# Patient Record
Sex: FEMALE | Race: BLACK OR AFRICAN AMERICAN | Employment: FULL TIME | URBAN - METROPOLITAN AREA
[De-identification: names, ages, dates, MRNs, and addresses within clinical notes are randomized per-mention and may not be internally consistent; named-entity substitution may affect disease eponyms.]

---

## 2017-08-24 ENCOUNTER — APPOINTMENT (OUTPATIENT)
Dept: CT IMAGING | Age: 42
End: 2017-08-24
Attending: EMERGENCY MEDICINE
Payer: COMMERCIAL

## 2017-08-24 ENCOUNTER — HOSPITAL ENCOUNTER (EMERGENCY)
Age: 42
Discharge: HOME | End: 2017-08-24
Attending: EMERGENCY MEDICINE
Payer: COMMERCIAL

## 2017-08-24 VITALS
DIASTOLIC BLOOD PRESSURE: 67 MMHG | SYSTOLIC BLOOD PRESSURE: 109 MMHG | HEIGHT: 61 IN | TEMPERATURE: 98.6 F | WEIGHT: 170.2 LBS | HEART RATE: 94 BPM | RESPIRATION RATE: 20 BRPM | BODY MASS INDEX: 32.13 KG/M2 | OXYGEN SATURATION: 97 %

## 2017-08-24 DIAGNOSIS — R42 DIZZINESS: Primary | ICD-10-CM

## 2017-08-24 DIAGNOSIS — E04.1 THYROID NODULE: ICD-10-CM

## 2017-08-24 DIAGNOSIS — R00.2 PALPITATIONS: ICD-10-CM

## 2017-08-24 LAB
ALBUMIN SERPL-MCNC: 4 G/DL (ref 3.5–5)
ALBUMIN/GLOB SERPL: 0.9 {RATIO} (ref 1.1–2.2)
ALP SERPL-CCNC: 135 U/L (ref 45–117)
ALT SERPL-CCNC: 28 U/L (ref 12–78)
ANION GAP SERPL CALC-SCNC: 7 MMOL/L (ref 5–15)
AST SERPL-CCNC: 23 U/L (ref 15–37)
ATRIAL RATE: 116 BPM
BASOPHILS # BLD: 0 K/UL (ref 0–0.1)
BASOPHILS NFR BLD: 0 % (ref 0–1)
BILIRUB SERPL-MCNC: 0.2 MG/DL (ref 0.2–1)
BUN SERPL-MCNC: 9 MG/DL (ref 6–20)
BUN/CREAT SERPL: 11 (ref 12–20)
CALCIUM SERPL-MCNC: 8.7 MG/DL (ref 8.5–10.1)
CALCULATED P AXIS, ECG09: 42 DEGREES
CALCULATED R AXIS, ECG10: 29 DEGREES
CALCULATED T AXIS, ECG11: 15 DEGREES
CHLORIDE SERPL-SCNC: 105 MMOL/L (ref 97–108)
CO2 SERPL-SCNC: 28 MMOL/L (ref 21–32)
CREAT SERPL-MCNC: 0.81 MG/DL (ref 0.55–1.02)
DIAGNOSIS, 93000: NORMAL
EOSINOPHIL # BLD: 0.1 K/UL (ref 0–0.4)
EOSINOPHIL NFR BLD: 1 % (ref 0–7)
ERYTHROCYTE [DISTWIDTH] IN BLOOD BY AUTOMATED COUNT: 14.1 % (ref 11.5–14.5)
GLOBULIN SER CALC-MCNC: 4.5 G/DL (ref 2–4)
GLUCOSE SERPL-MCNC: 129 MG/DL (ref 65–100)
HCT VFR BLD AUTO: 45.1 % (ref 35–47)
HGB BLD-MCNC: 14.9 G/DL (ref 11.5–16)
LYMPHOCYTES # BLD: 3.2 K/UL (ref 0.8–3.5)
LYMPHOCYTES NFR BLD: 46 % (ref 12–49)
MCH RBC QN AUTO: 27.8 PG (ref 26–34)
MCHC RBC AUTO-ENTMCNC: 33 G/DL (ref 30–36.5)
MCV RBC AUTO: 84.1 FL (ref 80–99)
MONOCYTES # BLD: 0.4 K/UL (ref 0–1)
MONOCYTES NFR BLD: 5 % (ref 5–13)
NEUTS SEG # BLD: 3.3 K/UL (ref 1.8–8)
NEUTS SEG NFR BLD: 48 % (ref 32–75)
P-R INTERVAL, ECG05: 174 MS
PLATELET # BLD AUTO: 242 K/UL (ref 150–400)
POTASSIUM SERPL-SCNC: 3.5 MMOL/L (ref 3.5–5.1)
PROT SERPL-MCNC: 8.5 G/DL (ref 6.4–8.2)
Q-T INTERVAL, ECG07: 290 MS
QRS DURATION, ECG06: 66 MS
QTC CALCULATION (BEZET), ECG08: 403 MS
RBC # BLD AUTO: 5.36 M/UL (ref 3.8–5.2)
SODIUM SERPL-SCNC: 140 MMOL/L (ref 136–145)
TROPONIN I SERPL-MCNC: <0.04 NG/ML
TSH SERPL DL<=0.05 MIU/L-ACNC: 2.21 UIU/ML (ref 0.36–3.74)
VENTRICULAR RATE, ECG03: 116 BPM
WBC # BLD AUTO: 7 K/UL (ref 3.6–11)

## 2017-08-24 PROCEDURE — 74011250636 HC RX REV CODE- 250/636: Performed by: EMERGENCY MEDICINE

## 2017-08-24 PROCEDURE — 99284 EMERGENCY DEPT VISIT MOD MDM: CPT

## 2017-08-24 PROCEDURE — 71275 CT ANGIOGRAPHY CHEST: CPT

## 2017-08-24 PROCEDURE — 74011000258 HC RX REV CODE- 258: Performed by: EMERGENCY MEDICINE

## 2017-08-24 PROCEDURE — 96374 THER/PROPH/DIAG INJ IV PUSH: CPT

## 2017-08-24 PROCEDURE — 96361 HYDRATE IV INFUSION ADD-ON: CPT

## 2017-08-24 PROCEDURE — 93005 ELECTROCARDIOGRAM TRACING: CPT

## 2017-08-24 PROCEDURE — 85025 COMPLETE CBC W/AUTO DIFF WBC: CPT | Performed by: EMERGENCY MEDICINE

## 2017-08-24 PROCEDURE — 80053 COMPREHEN METABOLIC PANEL: CPT | Performed by: EMERGENCY MEDICINE

## 2017-08-24 PROCEDURE — 84484 ASSAY OF TROPONIN QUANT: CPT | Performed by: EMERGENCY MEDICINE

## 2017-08-24 PROCEDURE — 74011636320 HC RX REV CODE- 636/320: Performed by: EMERGENCY MEDICINE

## 2017-08-24 PROCEDURE — 36415 COLL VENOUS BLD VENIPUNCTURE: CPT | Performed by: EMERGENCY MEDICINE

## 2017-08-24 PROCEDURE — 84443 ASSAY THYROID STIM HORMONE: CPT | Performed by: EMERGENCY MEDICINE

## 2017-08-24 RX ORDER — SODIUM CHLORIDE 9 MG/ML
1000 INJECTION, SOLUTION INTRAVENOUS ONCE
Status: COMPLETED | OUTPATIENT
Start: 2017-08-24 | End: 2017-08-24

## 2017-08-24 RX ORDER — SODIUM CHLORIDE 0.9 % (FLUSH) 0.9 %
10 SYRINGE (ML) INJECTION
Status: COMPLETED | OUTPATIENT
Start: 2017-08-24 | End: 2017-08-24

## 2017-08-24 RX ORDER — LORAZEPAM 2 MG/ML
1 INJECTION INTRAMUSCULAR
Status: COMPLETED | OUTPATIENT
Start: 2017-08-24 | End: 2017-08-24

## 2017-08-24 RX ADMIN — IOPAMIDOL 70 ML: 755 INJECTION, SOLUTION INTRAVENOUS at 02:54

## 2017-08-24 RX ADMIN — LORAZEPAM 1 MG: 2 INJECTION INTRAMUSCULAR; INTRAVENOUS at 02:29

## 2017-08-24 RX ADMIN — SODIUM CHLORIDE 1000 ML: 900 INJECTION, SOLUTION INTRAVENOUS at 02:31

## 2017-08-24 RX ADMIN — SODIUM CHLORIDE 100 ML: 900 INJECTION, SOLUTION INTRAVENOUS at 02:54

## 2017-08-24 RX ADMIN — Medication 10 ML: at 02:54

## 2017-08-24 NOTE — ED PROVIDER NOTES
HPI Comments: 36 yo female presents to ER for evaluation of dizziness, racing heart and shortness of breath. Onset was 1 hour prior to arrival. Pt reports that she has sensation of heart racing. No skipping of beats. No chest pain. Pt reports she then began to feel short of breath. She states she feels like she cant catch her breath. Pt has associated nausea after drinking some water but no vomiting. Pt reports an associated dizziness described as lightheadness. No fever, chills, cough, congestion, runny nose or sore throat. No headache, muscle aches or body aches. No dysuria, frequency or urgency. Pt had similar symptoms 1 week ago. Pt seen at outside ER and was given fluid and told to followup with her pcp. No known precipitating events. No alleviating factors at this time. Pt with hx of PE. Nonsmoker, denies alcohol, recreational drug use, recent travel, trauma, immobilization. Social hx  Nonsmoker  No alcohol      Patient is a 37 y.o. female presenting with dizziness. The history is provided by the patient. Dizziness   Associated symptoms include shortness of breath and nausea. Pertinent negatives include no chest pain, no vomiting and no headaches. Past Medical History:   Diagnosis Date    ADHD (attention deficit hyperactivity disorder)     Anemia     Insomnia     Migraine     PE (pulmonary embolism)        Past Surgical History:   Procedure Laterality Date    HX CHOLECYSTECTOMY      HX GASTRIC BYPASS  2009    HX GYN      HX HERNIA REPAIR      HX HYSTERECTOMY           History reviewed. No pertinent family history. Social History     Social History    Marital status:      Spouse name: N/A    Number of children: N/A    Years of education: N/A     Occupational History    Not on file.      Social History Main Topics    Smoking status: Never Smoker    Smokeless tobacco: Not on file    Alcohol use No    Drug use: No    Sexual activity: Not on file     Other Topics Concern    Not on file     Social History Narrative         ALLERGIES: Aspirin; Darvocet a500 [propoxyphene n-acetaminophen]; Percocet [oxycodone-acetaminophen]; and Percodan [oxycodone hcl-oxycodone-asa]    Review of Systems   Constitutional: Negative for chills and fever. Respiratory: Positive for shortness of breath. Negative for cough. Cardiovascular: Positive for palpitations. Negative for chest pain. Gastrointestinal: Positive for nausea. Negative for abdominal pain, blood in stool, diarrhea and vomiting. Genitourinary: Negative for dysuria, flank pain, frequency and urgency. Musculoskeletal: Negative for arthralgias, myalgias, neck pain and neck stiffness. Skin: Negative for rash and wound. Neurological: Positive for dizziness. Negative for weakness, numbness and headaches. All other systems reviewed and are negative. Vitals:    08/24/17 0114   BP: 141/89   Pulse: (!) 113   Resp: 16   Temp: 97.7 °F (36.5 °C)   SpO2: 100%   Weight: 77.2 kg (170 lb 3.2 oz)   Height: 5' 1\" (1.549 m)            Physical Exam   Constitutional: She is oriented to person, place, and time. She appears well-developed and well-nourished. No distress. HENT:   Head: Normocephalic and atraumatic. Right Ear: External ear normal.   Left Ear: External ear normal.   Eyes: Conjunctivae and EOM are normal. Pupils are equal, round, and reactive to light. Neck: Normal range of motion. Neck supple. Cardiovascular: Regular rhythm and normal heart sounds. Tachycardia present. Pulmonary/Chest: Effort normal and breath sounds normal. No respiratory distress. She has no wheezes. Abdominal: Soft. Normal appearance and bowel sounds are normal. She exhibits no shifting dullness, no distension, no pulsatile liver, no abdominal bruit, no pulsatile midline mass and no mass. There is no hepatosplenomegaly, splenomegaly or hepatomegaly. There is no tenderness.  There is no rigidity, no rebound, no guarding, no CVA tenderness, no tenderness at McBurney's point and negative Whelan's sign. Musculoskeletal: Normal range of motion. She exhibits no edema or tenderness. Neurological: She is alert and oriented to person, place, and time. She has normal reflexes. No cranial nerve deficit. Coordination normal.   Skin: Skin is warm and dry. No rash noted. No erythema. Psychiatric: She has a normal mood and affect. Her behavior is normal. Judgment and thought content normal.   Nursing note and vitals reviewed. MDM  Number of Diagnoses or Management Options  Dizziness:   Palpitations:   Thyroid nodule:   Diagnosis management comments: 38 yo female presenting to ER for dizziness, heart racing and shortness of breath. Lungs clear bilaterally. No respiratory distress. Pt slightly tachycardic. P: CT, lab, iv fluid    4:37 AM  Pt feeling better after iv fluid and ativan. No palpitations, no chest pain or shortness of breath. Heart rated now down to 90. CT negative for PE. Troponin negative. TSH in normal range. Discussed thyroid nodule with patient. She is already aware of it. Patient's results have been reviewed with them. Patient and/or family have verbally conveyed their understanding and agreement of the patient's signs, symptoms, diagnosis, treatment and prognosis and additionally agree to follow up as recommended or return to the Emergency Room should their condition change prior to follow-up. Discharge instructions have also been provided to the patient with some educational information regarding their diagnosis as well a list of reasons why they would want to return to the ER prior to their follow-up appointment should their condition change. Amount and/or Complexity of Data Reviewed  Discuss the patient with other providers: yes (ER attending-[facility])    Patient Progress  Patient progress: stable    ED Course       Procedures      ED EKG interpretation:  Rhythm: sinus tachycardia; and regular .  Rate (approx.): 116; Axis: normal; P wave: normal; QRS interval: normal ; ST/T wave: non-specific changes; This EKG was interpreted by Petar Fernandez PA-C, Dr. Malu Kaplan MD ED Provider. Pt case including HPI, PE, and all available lab and radiology results has been discussed with attending physician. Opportunity to evaluate patient has been provided to ER attending. Discharge and prescription plan has been agreed upon.

## 2017-08-24 NOTE — DISCHARGE INSTRUCTIONS
Thyroid Nodules: Care Instructions  Your Care Instructions  Thyroid nodules are growths or lumps in the thyroid gland. Your thyroid is in the front of your neck. It controls how your body uses energy. You may have tests to see if the nodule is caused by cancer. Most nodules aren't cancer and don't cause problems. Many don't even need treatment. If you do have cancer, it can usually be cured. Treatment will probably include surgery. You may also get radioactive iodine treatment. If your thyroid can't make thyroid hormone after treatment, you can take a pill every day to replace the hormone. Follow-up care is a key part of your treatment and safety. Be sure to make and go to all appointments, and call your doctor if you are having problems. It's also a good idea to know your test results and keep a list of the medicines you take. How can you care for yourself at home? · Be safe with medicines. If you take thyroid hormone medicine:  ¨ Take it exactly as prescribed. Call your doctor if you think you are having a problem with your medicine. If you take the right amount and don't skip doses, you probably won't have side effects. ¨ Do not take it with calcium, vitamins, or iron. ¨ Try not to miss a dose. ¨ Do not take extra doses. This will not help you get better any faster. It may also cause side effects. ¨ Tell your doctor about any medicines you take. This includes over-the-counter medicines. ¨ Wear a medical alert bracelet or necklace that says you take thyroid hormones. You can buy these at most drugstores. When should you call for help? Call 911 anytime you think you may need emergency care. For example, call if:  · You lose consciousness. Call your doctor now or seek immediate medical care if:  · You have shortness of breath. Watch closely for changes in your health, and be sure to contact your doctor if:  · You have pain in your neck, jaw, or ear. · You have problems swallowing.   · You feel weak and tired. · You have nervousness, a fast heartbeat, hand tremors, problems sleeping, increased sweating, and weight loss. · You do not feel better even though you are taking your medicine. Where can you learn more? Go to http://oralia-kalyani.info/. Enter E788 in the search box to learn more about \"Thyroid Nodules: Care Instructions. \"  Current as of: January 31, 2017  Content Version: 11.3  © 0881-0984 [employer]. Care instructions adapted under license by [employer] (which disclaims liability or warranty for this information). If you have questions about a medical condition or this instruction, always ask your healthcare professional. [place] [address] any warranty or liability for your use of this information. We hope that we have addressed all of your medical concerns. The examination and treatment you received in the Emergency Department were for an emergent problem and were not intended as complete care. It is important that you follow up with your healthcare provider(s) for ongoing care. If your symptoms worsen or do not improve as expected, and you are unable to reach your usual health care provider(s), you should return to the Emergency Department. Today's healthcare is undergoing tremendous change, and patient satisfaction surveys are one of the many tools to assess the quality of medical care. You may receive a survey from the [employer] organization regarding your experience in the Emergency Department. I hope that your experience has been completely positive, particularly the medical care that I provided. As such, please participate in the survey; anything less than excellent does not meet my expectations or intentions. [facility] and [facility] participate in nationally recognized quality of care measures.   If your blood pressure is greater than 120/80, as reported below, we urge that you seek medical care to address the potential of high blood pressure, commonly known as hypertension. Hypertension can be hereditary or can be caused by certain medical conditions, pain, stress, or \"white coat syndrome. \"       Please make an appointment with your health care provider(s) for follow up of your Emergency Department visit. VITALS:   Patient Vitals for the past 8 hrs:   Temp Pulse Resp BP SpO2   08/24/17 0330 - 89 21 98/57 99 %   08/24/17 0114 97.7 °F (36.5 °C) (!) 113 16 141/89 100 %          Thank you for allowing us to provide you with medical care today. We realize that you have many choices for your emergency care needs. Please choose us in the future for any continued health care needs. Myrick Kawasaki [address].   Office: [phone]            Recent Results (from the past 24 hour(s))   EKG, 12 LEAD, INITIAL    Collection Time: 08/24/17  1:27 AM   Result Value Ref Range    Ventricular Rate 116 BPM    Atrial Rate 116 BPM    P-R Interval 174 ms    QRS Duration 66 ms    Q-T Interval 290 ms    QTC Calculation (Bezet) 403 ms    Calculated P Axis 42 degrees    Calculated R Axis 29 degrees    Calculated T Axis 15 degrees    Diagnosis       Sinus tachycardia  Nonspecific T wave abnormality  When compared with ECG of 01-MAY-2016 10:59,  No significant change was found     CBC WITH AUTOMATED DIFF    Collection Time: 08/24/17  1:39 AM   Result Value Ref Range    WBC 7.0 3.6 - 11.0 K/uL    RBC 5.36 (H) 3.80 - 5.20 M/uL    HGB 14.9 11.5 - 16.0 g/dL    HCT 45.1 35.0 - 47.0 %    MCV 84.1 80.0 - 99.0 FL    MCH 27.8 26.0 - 34.0 PG    MCHC 33.0 30.0 - 36.5 g/dL    RDW 14.1 11.5 - 14.5 %    PLATELET 816 574 - 894 K/uL    NEUTROPHILS 48 32 - 75 %    LYMPHOCYTES 46 12 - 49 %    MONOCYTES 5 5 - 13 %    EOSINOPHILS 1 0 - 7 %    BASOPHILS 0 0 - 1 %    ABS. NEUTROPHILS 3.3 1.8 - 8.0 K/UL    ABS.  LYMPHOCYTES 3.2 0.8 - 3.5 K/UL ABS. MONOCYTES 0.4 0.0 - 1.0 K/UL    ABS. EOSINOPHILS 0.1 0.0 - 0.4 K/UL    ABS. BASOPHILS 0.0 0.0 - 0.1 K/UL   METABOLIC PANEL, COMPREHENSIVE    Collection Time: 08/24/17  1:39 AM   Result Value Ref Range    Sodium 140 136 - 145 mmol/L    Potassium 3.5 3.5 - 5.1 mmol/L    Chloride 105 97 - 108 mmol/L    CO2 28 21 - 32 mmol/L    Anion gap 7 5 - 15 mmol/L    Glucose 129 (H) 65 - 100 mg/dL    BUN 9 6 - 20 MG/DL    Creatinine 0.81 0.55 - 1.02 MG/DL    BUN/Creatinine ratio 11 (L) 12 - 20      GFR est AA >60 >60 ml/min/1.73m2    GFR est non-AA >60 >60 ml/min/1.73m2    Calcium 8.7 8.5 - 10.1 MG/DL    Bilirubin, total 0.2 0.2 - 1.0 MG/DL    ALT (SGPT) 28 12 - 78 U/L    AST (SGOT) 23 15 - 37 U/L    Alk. phosphatase 135 (H) 45 - 117 U/L    Protein, total 8.5 (H) 6.4 - 8.2 g/dL    Albumin 4.0 3.5 - 5.0 g/dL    Globulin 4.5 (H) 2.0 - 4.0 g/dL    A-G Ratio 0.9 (L) 1.1 - 2.2     TROPONIN I    Collection Time: 08/24/17  1:39 AM   Result Value Ref Range    Troponin-I, Qt. <0.04 <0.05 ng/mL       Cta Chest W Or W Wo Cont    Result Date: 8/24/2017  EXAM:  CT angiography chest INDICATION:  Tachycardia and shortness of breath. COMPARISON: None. TECHNIQUE: Helical thin section chest CT following uneventful intravenous administration of nonionic contrast according to departmental PE protocol. Coronal and sagittal reformats were performed. 3D/MIP post processing was performed. CT dose reduction was achieved through use of a standardized protocol tailored for this examination and automatic exposure control for dose modulation. Adaptive statistical iterative reconstruction (ASIR) was utilized. FINDINGS: This is a good quality study for the evaluation of pulmonary embolism to the first subsegmental arterial level. There is no pulmonary embolism to this level. A left thyroid nodule measures 1.9 x 2.0 cm. The aorta and main pulmonary artery are normal in caliber. Cardiac size is within normal limits. No pericardial effusion.  No lymphadenopathy by imaging size criteria. There is mild bilateral dependent atelectasis. There is no lung nodule, mass, or consolidation. No pleural effusion or pneumothorax. Central airways are unremarkable. There is a small hiatal hernia. Surgical changes are seen following gastric bypass. There is no acute abnormality in the visualized upper abdomen. The bony structures are age-appropriate     IMPRESSION: 1. There is no acute pulmonary embolism or other acute abnormality in the chest. 2. Left thyroid nodule measures 2.0 cm. Further evaluation with thyroid ultrasound is suggested if not recently performed. Dizziness: Care Instructions  Your Care Instructions  Dizziness is the feeling of unsteadiness or fuzziness in your head. It is different than having vertigo, which is a feeling that the room is spinning or that you are moving or falling. It is also different from lightheadedness, which is the feeling that you are about to faint. It can be hard to know what causes dizziness. Some people feel dizzy when they have migraine headaches. Sometimes bouts of flu can make you feel dizzy. Some medical conditions, such as heart problems or high blood pressure, can make you feel dizzy. Many medicines can cause dizziness, including medicines for high blood pressure, pain, or anxiety. If a medicine causes your symptoms, your doctor may recommend that you stop or change the medicine. If it is a problem with your heart, you may need medicine to help your heart work better. If there is no clear reason for your symptoms, your doctor may suggest watching and waiting for a while to see if the dizziness goes away on its own. Follow-up care is a key part of your treatment and safety. Be sure to make and go to all appointments, and call your doctor if you are having problems. It's also a good idea to know your test results and keep a list of the medicines you take. How can you care for yourself at home?   · If your doctor recommends or prescribes medicine, take it exactly as directed. Call your doctor if you think you are having a problem with your medicine. · Do not drive while you feel dizzy. · Try to prevent falls. Steps you can take include:  ¨ Using nonskid mats, adding grab bars near the tub, and using night-lights. ¨ Clearing your home so that walkways are free of anything you might trip on. ¨ Letting family and friends know that you have been feeling dizzy. This will help them know how to help you. When should you call for help? Call 911 anytime you think you may need emergency care. For example, call if:  · You passed out (lost consciousness). · You have dizziness along with symptoms of a heart attack. These may include:  ¨ Chest pain or pressure, or a strange feeling in the chest.  ¨ Sweating. ¨ Shortness of breath. ¨ Nausea or vomiting. ¨ Pain, pressure, or a strange feeling in the back, neck, jaw, or upper belly or in one or both shoulders or arms. ¨ Lightheadedness or sudden weakness. ¨ A fast or irregular heartbeat. · You have symptoms of a stroke. These may include:  ¨ Sudden numbness, tingling, weakness, or loss of movement in your face, arm, or leg, especially on only one side of your body. ¨ Sudden vision changes. ¨ Sudden trouble speaking. ¨ Sudden confusion or trouble understanding simple statements. ¨ Sudden problems with walking or balance. ¨ A sudden, severe headache that is different from past headaches. Call your doctor now or seek immediate medical care if:  · You feel dizzy and have a fever, headache, or ringing in your ears. · You have new or increased nausea and vomiting. · Your dizziness does not go away or comes back. Watch closely for changes in your health, and be sure to contact your doctor if:  · You do not get better as expected. Where can you learn more? Go to http://oralia-kalyani.info/.   Enter A258 in the search box to learn more about \"Dizziness: Care Instructions. \"  Current as of: March 20, 2017  Content Version: 11.3  © 9847-3074 [employer]. Care instructions adapted under license by [employer] (which disclaims liability or warranty for this information). If you have questions about a medical condition or this instruction, always ask your healthcare professional. [place] [address] any warranty or liability for your use of this information. Palpitations: Care Instructions  Your Care Instructions    Heart palpitations are the uncomfortable sensation that your heart is beating fast or irregularly. You might feel pounding or fluttering in your chest. It might feel like your heart is skipping a beat. Although palpitations may be caused by a heart problem, they also occur because of stress, fatigue, or use of alcohol, caffeine, or nicotine. Many medicines, including diet pills, antihistamines, decongestants, and some herbal products, can cause heart palpitations. Nearly everyone has palpitations from time to time. Depending on your symptoms, your doctor may need to do more tests to try to find the cause of your palpitations. Follow-up care is a key part of your treatment and safety. Be sure to make and go to all appointments, and call your doctor if you are having problems. It's also a good idea to know your test results and keep a list of the medicines you take. How can you care for yourself at home? · Avoid caffeine, nicotine, and excess alcohol. · Do not take illegal drugs, such as methamphetamines and cocaine. · Do not take weight loss or diet medicines unless you talk with your doctor first.  · Get plenty of sleep. · Do not overeat. · If you have palpitations again, take deep breaths and try to relax. This may slow a racing heart. · If you start to feel lightheaded, lie down to avoid injuries that might result if you pass out and fall down.   · Keep a record of your palpitations and bring it to your next doctor's appointment. Write down:  ¨ The date and time. ¨ Your pulse. (If your heart is beating fast, it may be hard to count your pulse.)  ¨ What you were doing when the palpitations started. ¨ How long the palpitations lasted. ¨ Any other symptoms. · If an activity causes palpitations, slow down or stop. Talk to your doctor before you do that activity again. · Take your medicines exactly as prescribed. Call your doctor if you think you are having a problem with your medicine. When should you call for help? Call 911 anytime you think you may need emergency care. For example, call if:  · You passed out (lost consciousness). · You have symptoms of a heart attack. These may include:  ¨ Chest pain or pressure, or a strange feeling in the chest.  ¨ Sweating. ¨ Shortness of breath. ¨ Pain, pressure, or a strange feeling in the back, neck, jaw, or upper belly or in one or both shoulders or arms. ¨ Lightheadedness or sudden weakness. ¨ A fast or irregular heartbeat. After you call 911, the  may tell you to chew 1 adult-strength or 2 to 4 low-dose aspirin. Wait for an ambulance. Do not try to drive yourself. · You have symptoms of a stroke. These may include:  ¨ Sudden numbness, tingling, weakness, or loss of movement in your face, arm, or leg, especially on only one side of your body. ¨ Sudden vision changes. ¨ Sudden trouble speaking. ¨ Sudden confusion or trouble understanding simple statements. ¨ Sudden problems with walking or balance. ¨ A sudden, severe headache that is different from past headaches. Call your doctor now or seek immediate medical care if:  · You have heart palpitations and:  ¨ Are dizzy or lightheaded, or you feel like you may faint. ¨ Have new or increased shortness of breath. Watch closely for changes in your health, and be sure to contact your doctor if:  · You continue to have heart palpitations. Where can you learn more?   Go to http://oralia-kalyani.info/. Enter R508 in the search box to learn more about \"Palpitations: Care Instructions. \"  Current as of: April 3, 2017  Content Version: 11.3  © 5130-5112 [employer]. Care instructions adapted under license by [employer] (which disclaims liability or warranty for this information). If you have questions about a medical condition or this instruction, always ask your healthcare professional. [place] [address] any warranty or liability for your use of this information.

## 2017-08-24 NOTE — Clinical Note
Follow-up with your doctor for further evaluation of thyroid nodule. See cardiologist for evaluation of your racing heart. Return to ER for any fever, chills, nausea, vomiting, chest pain, shortness of breath.

## 2017-08-24 NOTE — ED TRIAGE NOTES
Patient arrives to ED with c/o dizziness x 1 hr, + nausea, patient states she was seated at work when she became dizzy, states the same thing happened 1 week ago,

## 2017-08-24 NOTE — ED NOTES
Pt stated she felt SOB again. Placed on monitor x 3. O2 sats are 100%. RR even and unlabored at 16. Appears in no acute distress. PA aware.

## 2017-11-08 ENCOUNTER — HOSPITAL ENCOUNTER (OUTPATIENT)
Dept: PREADMISSION TESTING | Age: 42
Discharge: HOME | End: 2017-11-08
Payer: SELF-PAY

## 2017-11-08 VITALS
SYSTOLIC BLOOD PRESSURE: 128 MMHG | HEART RATE: 60 BPM | OXYGEN SATURATION: 97 % | BODY MASS INDEX: 31.72 KG/M2 | TEMPERATURE: 98.2 F | HEIGHT: 61 IN | RESPIRATION RATE: 14 BRPM | DIASTOLIC BLOOD PRESSURE: 82 MMHG | WEIGHT: 168 LBS

## 2017-11-08 LAB
ANION GAP SERPL CALC-SCNC: 6 MMOL/L (ref 5–15)
BASOPHILS # BLD: 0 K/UL (ref 0–0.1)
BASOPHILS NFR BLD: 0 % (ref 0–1)
BUN SERPL-MCNC: 5 MG/DL (ref 6–20)
BUN/CREAT SERPL: 7 (ref 12–20)
CALCIUM SERPL-MCNC: 8.8 MG/DL (ref 8.5–10.1)
CHLORIDE SERPL-SCNC: 106 MMOL/L (ref 97–108)
CO2 SERPL-SCNC: 27 MMOL/L (ref 21–32)
CREAT SERPL-MCNC: 0.73 MG/DL (ref 0.55–1.02)
EOSINOPHIL # BLD: 0.1 K/UL (ref 0–0.4)
EOSINOPHIL NFR BLD: 1 % (ref 0–7)
ERYTHROCYTE [DISTWIDTH] IN BLOOD BY AUTOMATED COUNT: 13.9 % (ref 11.5–14.5)
GLUCOSE SERPL-MCNC: 140 MG/DL (ref 65–100)
HCT VFR BLD AUTO: 41.9 % (ref 35–47)
HGB BLD-MCNC: 13.5 G/DL (ref 11.5–16)
LYMPHOCYTES # BLD: 2.8 K/UL (ref 0.8–3.5)
LYMPHOCYTES NFR BLD: 44 % (ref 12–49)
MCH RBC QN AUTO: 27.3 PG (ref 26–34)
MCHC RBC AUTO-ENTMCNC: 32.2 G/DL (ref 30–36.5)
MCV RBC AUTO: 84.8 FL (ref 80–99)
MONOCYTES # BLD: 0.3 K/UL (ref 0–1)
MONOCYTES NFR BLD: 4 % (ref 5–13)
NEUTS SEG # BLD: 3.3 K/UL (ref 1.8–8)
NEUTS SEG NFR BLD: 51 % (ref 32–75)
PLATELET # BLD AUTO: 256 K/UL (ref 150–400)
POTASSIUM SERPL-SCNC: 4.9 MMOL/L (ref 3.5–5.1)
RBC # BLD AUTO: 4.94 M/UL (ref 3.8–5.2)
SODIUM SERPL-SCNC: 139 MMOL/L (ref 136–145)
WBC # BLD AUTO: 6.5 K/UL (ref 3.6–11)

## 2017-11-08 PROCEDURE — 36415 COLL VENOUS BLD VENIPUNCTURE: CPT | Performed by: PLASTIC SURGERY

## 2017-11-08 PROCEDURE — 80048 BASIC METABOLIC PNL TOTAL CA: CPT | Performed by: PLASTIC SURGERY

## 2017-11-08 PROCEDURE — 85025 COMPLETE CBC W/AUTO DIFF WBC: CPT | Performed by: PLASTIC SURGERY

## 2017-11-08 RX ORDER — BISMUTH SUBSALICYLATE 262 MG
1 TABLET,CHEWABLE ORAL DAILY
COMMUNITY

## 2017-11-08 RX ORDER — DIPHENHYDRAMINE HCL 25 MG
25 CAPSULE ORAL
COMMUNITY

## 2017-11-09 NOTE — PERIOP NOTES
FOLLOW UP:    PAT TEST RESULTS FAXED TO DR. SANCHEZ'S OFFICE; SPOKE WITH CHUY , NURSE NOT AVAILABLE; WILL HAVE NURSE CALL BACK TO SEE IF EKG CAN BE DONE DOS OR IF SHE NEEDS TO RETURN TO PAT PRIOR TO DOS. [facility] PHONE NUMBER GIVEN FOR RETURN CALL.

## 2017-11-09 NOTE — PERIOP NOTES
FOLLOW UP:    CALLED DR. SANCHEZ'S OFFICE; SPOKE WITH LA-NURSE; DR. SANCHEZ IS OUT OF THE OFFICE UNTIL NEXT WEEK.   OK TO DO EKG STAT DOS

## 2017-11-15 ENCOUNTER — APPOINTMENT (OUTPATIENT)
Dept: CT IMAGING | Age: 42
End: 2017-11-15
Attending: EMERGENCY MEDICINE
Payer: COMMERCIAL

## 2017-11-15 ENCOUNTER — APPOINTMENT (OUTPATIENT)
Dept: GENERAL RADIOLOGY | Age: 42
End: 2017-11-15
Attending: EMERGENCY MEDICINE
Payer: COMMERCIAL

## 2017-11-15 ENCOUNTER — HOSPITAL ENCOUNTER (EMERGENCY)
Age: 42
Discharge: HOME | End: 2017-11-15
Attending: EMERGENCY MEDICINE
Payer: COMMERCIAL

## 2017-11-15 VITALS
DIASTOLIC BLOOD PRESSURE: 76 MMHG | SYSTOLIC BLOOD PRESSURE: 122 MMHG | HEART RATE: 79 BPM | BODY MASS INDEX: 31.15 KG/M2 | OXYGEN SATURATION: 100 % | HEIGHT: 61 IN | WEIGHT: 165 LBS | RESPIRATION RATE: 12 BRPM

## 2017-11-15 DIAGNOSIS — R07.9 ACUTE CHEST PAIN: Primary | ICD-10-CM

## 2017-11-15 LAB
ANION GAP SERPL CALC-SCNC: 10 MMOL/L (ref 5–15)
ATRIAL RATE: 82 BPM
BASOPHILS # BLD: 0 K/UL (ref 0–0.1)
BASOPHILS NFR BLD: 0 % (ref 0–1)
BUN SERPL-MCNC: 7 MG/DL (ref 6–20)
BUN/CREAT SERPL: 10 (ref 12–20)
CALCIUM SERPL-MCNC: 9 MG/DL (ref 8.5–10.1)
CALCULATED P AXIS, ECG09: 47 DEGREES
CALCULATED R AXIS, ECG10: 39 DEGREES
CALCULATED T AXIS, ECG11: 37 DEGREES
CHLORIDE SERPL-SCNC: 105 MMOL/L (ref 97–108)
CO2 SERPL-SCNC: 26 MMOL/L (ref 21–32)
CREAT SERPL-MCNC: 0.7 MG/DL (ref 0.55–1.02)
D DIMER PPP FEU-MCNC: 0.85 MG/L FEU (ref 0–0.65)
DIAGNOSIS, 93000: NORMAL
EOSINOPHIL # BLD: 0 K/UL (ref 0–0.4)
EOSINOPHIL NFR BLD: 1 % (ref 0–7)
ERYTHROCYTE [DISTWIDTH] IN BLOOD BY AUTOMATED COUNT: 13.8 % (ref 11.5–14.5)
GLUCOSE SERPL-MCNC: 90 MG/DL (ref 65–100)
HCT VFR BLD AUTO: 42 % (ref 35–47)
HGB BLD-MCNC: 13.4 G/DL (ref 11.5–16)
LYMPHOCYTES # BLD: 1.9 K/UL (ref 0.8–3.5)
LYMPHOCYTES NFR BLD: 26 % (ref 12–49)
MCH RBC QN AUTO: 27.2 PG (ref 26–34)
MCHC RBC AUTO-ENTMCNC: 31.9 G/DL (ref 30–36.5)
MCV RBC AUTO: 85.4 FL (ref 80–99)
MONOCYTES # BLD: 0.3 K/UL (ref 0–1)
MONOCYTES NFR BLD: 5 % (ref 5–13)
NEUTS SEG # BLD: 5.1 K/UL (ref 1.8–8)
NEUTS SEG NFR BLD: 68 % (ref 32–75)
P-R INTERVAL, ECG05: 184 MS
PLATELET # BLD AUTO: 209 K/UL (ref 150–400)
POTASSIUM SERPL-SCNC: 3.7 MMOL/L (ref 3.5–5.1)
Q-T INTERVAL, ECG07: 380 MS
QRS DURATION, ECG06: 70 MS
QTC CALCULATION (BEZET), ECG08: 443 MS
RBC # BLD AUTO: 4.92 M/UL (ref 3.8–5.2)
SODIUM SERPL-SCNC: 141 MMOL/L (ref 136–145)
TROPONIN I SERPL-MCNC: <0.04 NG/ML
TROPONIN I SERPL-MCNC: <0.04 NG/ML
VENTRICULAR RATE, ECG03: 82 BPM
WBC # BLD AUTO: 7.3 K/UL (ref 3.6–11)

## 2017-11-15 PROCEDURE — 74011250636 HC RX REV CODE- 250/636: Performed by: EMERGENCY MEDICINE

## 2017-11-15 PROCEDURE — 85025 COMPLETE CBC W/AUTO DIFF WBC: CPT | Performed by: EMERGENCY MEDICINE

## 2017-11-15 PROCEDURE — 96376 TX/PRO/DX INJ SAME DRUG ADON: CPT

## 2017-11-15 PROCEDURE — 71275 CT ANGIOGRAPHY CHEST: CPT

## 2017-11-15 PROCEDURE — 93005 ELECTROCARDIOGRAM TRACING: CPT

## 2017-11-15 PROCEDURE — 96374 THER/PROPH/DIAG INJ IV PUSH: CPT

## 2017-11-15 PROCEDURE — 99285 EMERGENCY DEPT VISIT HI MDM: CPT

## 2017-11-15 PROCEDURE — 85379 FIBRIN DEGRADATION QUANT: CPT | Performed by: EMERGENCY MEDICINE

## 2017-11-15 PROCEDURE — 74011636320 HC RX REV CODE- 636/320: Performed by: RADIOLOGY

## 2017-11-15 PROCEDURE — 71020 XR CHEST PA LAT: CPT

## 2017-11-15 PROCEDURE — 84484 ASSAY OF TROPONIN QUANT: CPT | Performed by: EMERGENCY MEDICINE

## 2017-11-15 PROCEDURE — 74011000250 HC RX REV CODE- 250: Performed by: EMERGENCY MEDICINE

## 2017-11-15 PROCEDURE — 80048 BASIC METABOLIC PNL TOTAL CA: CPT | Performed by: EMERGENCY MEDICINE

## 2017-11-15 PROCEDURE — 36415 COLL VENOUS BLD VENIPUNCTURE: CPT | Performed by: EMERGENCY MEDICINE

## 2017-11-15 PROCEDURE — 74011250637 HC RX REV CODE- 250/637: Performed by: EMERGENCY MEDICINE

## 2017-11-15 RX ORDER — SODIUM CHLORIDE 0.9 % (FLUSH) 0.9 %
5-10 SYRINGE (ML) INJECTION EVERY 8 HOURS
Status: DISCONTINUED | OUTPATIENT
Start: 2017-11-15 | End: 2017-11-15 | Stop reason: HOSPADM

## 2017-11-15 RX ORDER — ONDANSETRON 2 MG/ML
INJECTION INTRAMUSCULAR; INTRAVENOUS
Status: DISCONTINUED
Start: 2017-11-15 | End: 2017-11-15 | Stop reason: HOSPADM

## 2017-11-15 RX ORDER — ONDANSETRON 2 MG/ML
4 INJECTION INTRAMUSCULAR; INTRAVENOUS
Status: COMPLETED | OUTPATIENT
Start: 2017-11-15 | End: 2017-11-15

## 2017-11-15 RX ORDER — SODIUM CHLORIDE 0.9 % (FLUSH) 0.9 %
5-10 SYRINGE (ML) INJECTION AS NEEDED
Status: DISCONTINUED | OUTPATIENT
Start: 2017-11-15 | End: 2017-11-15 | Stop reason: HOSPADM

## 2017-11-15 RX ORDER — ONDANSETRON 2 MG/ML
8 INJECTION INTRAMUSCULAR; INTRAVENOUS
Status: COMPLETED | OUTPATIENT
Start: 2017-11-15 | End: 2017-11-15

## 2017-11-15 RX ORDER — HYDROCODONE BITARTRATE AND ACETAMINOPHEN 5; 325 MG/1; MG/1
1 TABLET ORAL
Status: COMPLETED | OUTPATIENT
Start: 2017-11-15 | End: 2017-11-15

## 2017-11-15 RX ADMIN — HYDROCODONE BITARTRATE AND ACETAMINOPHEN 1 TABLET: 5; 325 TABLET ORAL at 16:45

## 2017-11-15 RX ADMIN — Medication 10 ML: at 15:06

## 2017-11-15 RX ADMIN — ONDANSETRON 4 MG: 2 INJECTION INTRAMUSCULAR; INTRAVENOUS at 15:05

## 2017-11-15 RX ADMIN — LIDOCAINE HYDROCHLORIDE 40 ML: 20 SOLUTION ORAL; TOPICAL at 17:57

## 2017-11-15 RX ADMIN — IOPAMIDOL 65 ML: 755 INJECTION, SOLUTION INTRAVENOUS at 17:07

## 2017-11-15 RX ADMIN — ONDANSETRON 8 MG: 2 INJECTION INTRAMUSCULAR; INTRAVENOUS at 17:56

## 2017-11-15 NOTE — DISCHARGE INSTRUCTIONS
Chest Pain: Care Instructions  Your Care Instructions    There are many things that can cause chest pain. Some are not serious and will get better on their own in a few days. But some kinds of chest pain need more testing and treatment. Your doctor may have recommended a follow-up visit in the next 8 to 12 hours. If you are not getting better, you may need more tests or treatment. Even though your doctor has released you, you still need to watch for any problems. The doctor carefully checked you, but sometimes problems can develop later. If you have new symptoms or if your symptoms do not get better, get medical care right away. If you have worse or different chest pain or pressure that lasts more than 5 minutes or you passed out (lost consciousness), call 911 or seek other emergency help right away. A medical visit is only one step in your treatment. Even if you feel better, you still need to do what your doctor recommends, such as going to all suggested follow-up appointments and taking medicines exactly as directed. This will help you recover and help prevent future problems. How can you care for yourself at home? · Rest until you feel better. · Take your medicine exactly as prescribed. Call your doctor if you think you are having a problem with your medicine. · Do not drive after taking a prescription pain medicine. When should you call for help? Call 911 if:  ? · You passed out (lost consciousness). ? · You have severe difficulty breathing. ? · You have symptoms of a heart attack. These may include:  ¨ Chest pain or pressure, or a strange feeling in your chest.  ¨ Sweating. ¨ Shortness of breath. ¨ Nausea or vomiting. ¨ Pain, pressure, or a strange feeling in your back, neck, jaw, or upper belly or in one or both shoulders or arms. ¨ Lightheadedness or sudden weakness. ¨ A fast or irregular heartbeat.   After you call 911, the  may tell you to chew 1 adult-strength or 2 to 4 low-dose aspirin. Wait for an ambulance. Do not try to drive yourself. ?Call your doctor today if:  ? · You have any trouble breathing. ? · Your chest pain gets worse. ? · You are dizzy or lightheaded, or you feel like you may faint. ? · You are not getting better as expected. ? · You are having new or different chest pain. Where can you learn more? Go to http://oralia-kalyani.info/. Enter A120 in the search box to learn more about \"Chest Pain: Care Instructions. \"  Current as of: March 20, 2017  Content Version: 11.4  © 1955-2656 [employer]. Care instructions adapted under license by [employer] (which disclaims liability or warranty for this information). If you have questions about a medical condition or this instruction, always ask your healthcare professional. [address] any warranty or liability for your use of this information.

## 2017-11-15 NOTE — LETTER
[address] 
[facility] EMERGENCY DEPT 
[address] [place] [address] [id number] [phone] Work/School Note Date: 11/15/2017 To Whom It May concern: 
 
Francisco Kapoor was seen and treated today in the emergency room by the following Dr. Barry Ruelas. Francisco Kapoor may return to work on 11/16/2017.  
 
Sincerely, 
 
 
 
 
Barry Ruelas MD

## 2017-11-15 NOTE — ED TRIAGE NOTES
Patient co chest pain intermittent since august. States she normally foes to [employer] but to day wanted to got to [employer]. Patient reports chest pressure that starts in her stomach and goes to her chest. Patient reported nausea when riding in the ambulance.

## 2017-11-15 NOTE — ED NOTES
The patient states relief of nausea. Given complete discharge instructions by Dr. Cricket Emanuel. The IV lock was removed and the patient was discharged home with .

## 2017-11-15 NOTE — ED PROVIDER NOTES
HPI Comments: 37 y.o. female with past medical history significant for anemia, ADHD, migraine, PE, and asthma who presents from home via EMS with chief complaint of CP. Pt reports that she woke up one hour ago with \"pressure\" in her left upper chest as if someone were \"sitting on\" her. Pain makes it difficult for her to breathe and she states that getting up and moving made the pain worse. Medication for gas relief offered no relief of pain. Pt had no complaints when she fell asleep 2.5 hours ago but does not that he recently had a cold. Pt denies any recent travel or ABx use. She states that her only medications are adderall and tegretol. There are no other acute medical concerns at this time. Social hx: Never smoker. No alcohol. No recreational drug use. Significant FMHx: Mother with h/o heart and kidney disease. Note written by Jaelyn Anthony, as dictated by Jeannie Li MD 1:36 PM    The history is provided by the patient. No  was used. Past Medical History:   Diagnosis Date    ADHD (attention deficit hyperactivity disorder)     Anemia     Asthma     HX OF IT  DURING SEASONAL ALLERGIES. NO FLARE UP \"IN A LONG TIME\"    Insomnia     Migraine     Nausea & vomiting     NAUSEA    PE (pulmonary embolism) 2009    POST GASTRIC BY PASS       Past Surgical History:   Procedure Laterality Date    HX CHOLECYSTECTOMY      HX GASTRIC BYPASS  2009    HX GYN      BILATERAL TUBAL LIGATION     HX HEENT      TONSILLECTOMY    HX HEENT      EXTRACTION OF WISDOM TEETH X 4    HX HERNIA REPAIR      HX HYSTERECTOMY           Family History:   Problem Relation Age of Onset    Diabetes Mother     Hypertension Mother     Alcohol abuse Father        Social History     Social History    Marital status:      Spouse name: N/A    Number of children: N/A    Years of education: N/A     Occupational History    Not on file.      Social History Main Topics    Smoking status: Never Smoker    Smokeless tobacco: Never Used    Alcohol use No    Drug use: No    Sexual activity: Not on file     Other Topics Concern    Not on file     Social History Narrative         ALLERGIES: Aspirin; Darvocet a500 [propoxyphene n-acetaminophen]; Percocet [oxycodone-acetaminophen]; and Percodan [oxycodone hcl-oxycodone-asa]    Review of Systems   Constitutional: Negative. Negative for appetite change, fever and unexpected weight change. HENT: Negative. Negative for ear pain, hearing loss, nosebleeds, rhinorrhea, sore throat and trouble swallowing. Respiratory: Positive for shortness of breath. Negative for cough and chest tightness. Cardiovascular: Positive for chest pain. Negative for palpitations. Gastrointestinal: Negative. Negative for abdominal distention, abdominal pain, blood in stool and vomiting. Endocrine: Negative. Genitourinary: Negative for dysuria and hematuria. Musculoskeletal: Negative. Negative for back pain and myalgias. Skin: Negative. Negative for rash. Allergic/Immunologic: Negative. Neurological: Negative. Negative for dizziness, syncope, weakness and numbness. Hematological: Negative. Psychiatric/Behavioral: Negative. All other systems reviewed and are negative. Vitals:    11/15/17 1333   BP: (!) 129/97   Pulse: 81   Resp: 17   SpO2: 100%   Weight: 74.8 kg (165 lb)   Height: 5' 1\" (1.549 m)            Physical Exam   Constitutional: She is oriented to person, place, and time. She appears well-developed and well-nourished. No distress. HENT:   Head: Normocephalic and atraumatic. Right Ear: External ear normal.   Left Ear: External ear normal.   Nose: Nose normal.   Mouth/Throat: Oropharynx is clear and moist.   Eyes: Conjunctivae and EOM are normal. Pupils are equal, round, and reactive to light. Neck: Normal range of motion. Neck supple. No JVD present. No thyromegaly present.    Cardiovascular: Normal rate, regular rhythm, normal heart sounds and intact distal pulses. No murmur heard. Pulmonary/Chest: Effort normal and breath sounds normal. No respiratory distress. She has no wheezes. She has no rales. Abdominal: Soft. Bowel sounds are normal. She exhibits no distension. There is no tenderness. Musculoskeletal: Normal range of motion. She exhibits no edema. Neurological: She is alert and oriented to person, place, and time. No cranial nerve deficit. Skin: Skin is warm and dry. No rash noted. Psychiatric: She has a normal mood and affect. Her behavior is normal. Thought content normal.      Note written by Jaelyn Sharma, as dictated by Emery Mcdermott MD 1:40 PM      [facility]  ED Course       Procedures      ED EKG interpretation:  Rhythm: normal sinus rhythm; and regular . Rate (approx.): 82; Axis: normal; ST/T wave: normal.   Note written by Jaelyn Sharma, as dictated by Emery Mcdermott MD 1:39 PM        3:07 PM  Change of shift. Care of patient signed over to Dr Simone Lord. Handoff complete.

## 2017-11-21 ENCOUNTER — APPOINTMENT (OUTPATIENT)
Dept: GENERAL RADIOLOGY | Age: 42
End: 2017-11-21
Attending: PLASTIC SURGERY
Payer: COMMERCIAL

## 2017-11-21 ENCOUNTER — ANESTHESIA EVENT (OUTPATIENT)
Dept: MEDSURG UNIT | Age: 42
End: 2017-11-21
Payer: COMMERCIAL

## 2017-11-21 ENCOUNTER — HOSPITAL ENCOUNTER (OUTPATIENT)
Age: 42
Setting detail: OBSERVATION
Discharge: HOME | End: 2017-11-22
Attending: PLASTIC SURGERY | Admitting: PLASTIC SURGERY
Payer: COMMERCIAL

## 2017-11-21 ENCOUNTER — ANESTHESIA (OUTPATIENT)
Dept: MEDSURG UNIT | Age: 42
End: 2017-11-21
Payer: COMMERCIAL

## 2017-11-21 PROBLEM — N62 HYPERTROPHY OF BREAST: Status: ACTIVE | Noted: 2017-11-21

## 2017-11-21 PROBLEM — Z41.1 ENCOUNTER FOR COSMETIC SURGERY: Status: ACTIVE | Noted: 2017-11-21

## 2017-11-21 LAB
ALBUMIN SERPL-MCNC: 3.4 G/DL (ref 3.5–5)
ALBUMIN/GLOB SERPL: 1 {RATIO} (ref 1.1–2.2)
ALP SERPL-CCNC: 97 U/L (ref 45–117)
ALT SERPL-CCNC: 32 U/L (ref 12–78)
ANION GAP SERPL CALC-SCNC: 17 MMOL/L (ref 5–15)
AST SERPL-CCNC: 32 U/L (ref 15–37)
BASOPHILS # BLD: 0 K/UL (ref 0–0.1)
BASOPHILS NFR BLD: 0 % (ref 0–1)
BILIRUB SERPL-MCNC: 0.4 MG/DL (ref 0.2–1)
BUN SERPL-MCNC: 6 MG/DL (ref 6–20)
BUN/CREAT SERPL: 6 (ref 12–20)
CALCIUM SERPL-MCNC: 8.1 MG/DL (ref 8.5–10.1)
CHLORIDE SERPL-SCNC: 102 MMOL/L (ref 97–108)
CO2 SERPL-SCNC: 16 MMOL/L (ref 21–32)
CREAT SERPL-MCNC: 1.02 MG/DL (ref 0.55–1.02)
DIFFERENTIAL METHOD BLD: ABNORMAL
EOSINOPHIL # BLD: 0 K/UL (ref 0–0.4)
EOSINOPHIL NFR BLD: 0 % (ref 0–7)
ERYTHROCYTE [DISTWIDTH] IN BLOOD BY AUTOMATED COUNT: 13.9 % (ref 11.5–14.5)
GLOBULIN SER CALC-MCNC: 3.4 G/DL (ref 2–4)
GLUCOSE BLD STRIP.AUTO-MCNC: 227 MG/DL (ref 65–100)
GLUCOSE SERPL-MCNC: 266 MG/DL (ref 65–100)
HCT VFR BLD AUTO: 35.4 % (ref 35–47)
HGB BLD-MCNC: 11.3 G/DL (ref 11.5–16)
LYMPHOCYTES # BLD: 2.3 K/UL (ref 0.8–3.5)
LYMPHOCYTES NFR BLD: 8 % (ref 12–49)
MAGNESIUM SERPL-MCNC: 1.7 MG/DL (ref 1.6–2.4)
MCH RBC QN AUTO: 27.3 PG (ref 26–34)
MCHC RBC AUTO-ENTMCNC: 31.9 G/DL (ref 30–36.5)
MCV RBC AUTO: 85.5 FL (ref 80–99)
MONOCYTES # BLD: 1.4 K/UL (ref 0–1)
MONOCYTES NFR BLD: 5 % (ref 5–13)
NEUTS BAND NFR BLD MANUAL: 3 % (ref 0–6)
NEUTS SEG # BLD: 24.5 K/UL (ref 1.8–8)
NEUTS SEG NFR BLD: 84 % (ref 32–75)
PLATELET # BLD AUTO: 257 K/UL (ref 150–400)
POTASSIUM SERPL-SCNC: 3.8 MMOL/L (ref 3.5–5.1)
PROT SERPL-MCNC: 6.8 G/DL (ref 6.4–8.2)
RBC # BLD AUTO: 4.14 M/UL (ref 3.8–5.2)
RBC MORPH BLD: ABNORMAL
SERVICE CMNT-IMP: ABNORMAL
SODIUM SERPL-SCNC: 135 MMOL/L (ref 136–145)
TROPONIN I SERPL-MCNC: <0.04 NG/ML
WBC # BLD AUTO: 28.2 K/UL (ref 3.6–11)

## 2017-11-21 PROCEDURE — 76210000036 HC AMBSU PH I REC 1.5 TO 2 HR: Performed by: PLASTIC SURGERY

## 2017-11-21 PROCEDURE — 74011250636 HC RX REV CODE- 250/636

## 2017-11-21 PROCEDURE — 77030033138 HC SUT PGA STRATFX J&J -B: Performed by: PLASTIC SURGERY

## 2017-11-21 PROCEDURE — 77030026438 HC STYL ET INTUB CARD -A: Performed by: ANESTHESIOLOGY

## 2017-11-21 PROCEDURE — 77030002996 HC SUT SLK J&J -A: Performed by: PLASTIC SURGERY

## 2017-11-21 PROCEDURE — 74011250636 HC RX REV CODE- 250/636: Performed by: ANESTHESIOLOGY

## 2017-11-21 PROCEDURE — 77030012407 HC DRN WND BARD -B: Performed by: PLASTIC SURGERY

## 2017-11-21 PROCEDURE — 36415 COLL VENOUS BLD VENIPUNCTURE: CPT | Performed by: PLASTIC SURGERY

## 2017-11-21 PROCEDURE — 77030019702 HC WRP THER MENM -C: Performed by: PLASTIC SURGERY

## 2017-11-21 PROCEDURE — 77030008534 HC TBNG LIPOSUC BYRO -B: Performed by: PLASTIC SURGERY

## 2017-11-21 PROCEDURE — 77030018836 HC SOL IRR NACL ICUM -A: Performed by: PLASTIC SURGERY

## 2017-11-21 PROCEDURE — 80053 COMPREHEN METABOLIC PANEL: CPT | Performed by: PLASTIC SURGERY

## 2017-11-21 PROCEDURE — 77030008684 HC TU ET CUF COVD -B: Performed by: ANESTHESIOLOGY

## 2017-11-21 PROCEDURE — 71010 XR CHEST PORT: CPT

## 2017-11-21 PROCEDURE — 77030002966 HC SUT PDS J&J -A: Performed by: PLASTIC SURGERY

## 2017-11-21 PROCEDURE — 84484 ASSAY OF TROPONIN QUANT: CPT | Performed by: PLASTIC SURGERY

## 2017-11-21 PROCEDURE — P9045 ALBUMIN (HUMAN), 5%, 250 ML: HCPCS

## 2017-11-21 PROCEDURE — 77010033678 HC OXYGEN DAILY

## 2017-11-21 PROCEDURE — 77030013567 HC DRN WND RESERV BARD -A: Performed by: PLASTIC SURGERY

## 2017-11-21 PROCEDURE — 85025 COMPLETE CBC W/AUTO DIFF WBC: CPT | Performed by: PLASTIC SURGERY

## 2017-11-21 PROCEDURE — 77030011244 HC DRN WND HUBLS J&J -B: Performed by: PLASTIC SURGERY

## 2017-11-21 PROCEDURE — 77030012406 HC DRN WND PENRS BARD -A: Performed by: PLASTIC SURGERY

## 2017-11-21 PROCEDURE — 76060000071 HC AMB SURG ANES 6 TO 6.5 HR: Performed by: PLASTIC SURGERY

## 2017-11-21 PROCEDURE — 65270000029 HC RM PRIVATE

## 2017-11-21 PROCEDURE — 77030008467 HC STPLR SKN COVD -B: Performed by: PLASTIC SURGERY

## 2017-11-21 PROCEDURE — 77030008538 HC TBNG LIPO SUC WELL -B: Performed by: PLASTIC SURGERY

## 2017-11-21 PROCEDURE — 74011250636 HC RX REV CODE- 250/636: Performed by: PLASTIC SURGERY

## 2017-11-21 PROCEDURE — 99218 HC RM OBSERVATION: CPT

## 2017-11-21 PROCEDURE — 88302 TISSUE EXAM BY PATHOLOGIST: CPT | Performed by: PLASTIC SURGERY

## 2017-11-21 PROCEDURE — 74011000250 HC RX REV CODE- 250: Performed by: PLASTIC SURGERY

## 2017-11-21 PROCEDURE — 77030032490 HC SLV COMPR SCD KNE COVD -B: Performed by: PLASTIC SURGERY

## 2017-11-21 PROCEDURE — 74011000250 HC RX REV CODE- 250

## 2017-11-21 PROCEDURE — 83735 ASSAY OF MAGNESIUM: CPT | Performed by: PLASTIC SURGERY

## 2017-11-21 PROCEDURE — 77030013079 HC BLNKT BAIR HGGR 3M -A: Performed by: ANESTHESIOLOGY

## 2017-11-21 PROCEDURE — 77030010507 HC ADH SKN DERMBND J&J -B: Performed by: PLASTIC SURGERY

## 2017-11-21 PROCEDURE — 77030003666 HC NDL SPINAL BD -A: Performed by: PLASTIC SURGERY

## 2017-11-21 PROCEDURE — 74011258636 HC RX REV CODE- 258/636: Performed by: PLASTIC SURGERY

## 2017-11-21 PROCEDURE — 82962 GLUCOSE BLOOD TEST: CPT

## 2017-11-21 PROCEDURE — 77030034850: Performed by: PLASTIC SURGERY

## 2017-11-21 PROCEDURE — 76030000012 HC AMB SURG OR TIME 6 TO 6.5: Performed by: PLASTIC SURGERY

## 2017-11-21 PROCEDURE — 77030011640 HC PAD GRND REM COVD -A: Performed by: PLASTIC SURGERY

## 2017-11-21 PROCEDURE — 93005 ELECTROCARDIOGRAM TRACING: CPT

## 2017-11-21 PROCEDURE — 74011250637 HC RX REV CODE- 250/637

## 2017-11-21 PROCEDURE — 74011000258 HC RX REV CODE- 258: Performed by: PLASTIC SURGERY

## 2017-11-21 PROCEDURE — 77030002933 HC SUT MCRYL J&J -A: Performed by: PLASTIC SURGERY

## 2017-11-21 PROCEDURE — 77030020255 HC SOL INJ LR 1000ML BG: Performed by: PLASTIC SURGERY

## 2017-11-21 PROCEDURE — 77030020782 HC GWN BAIR PAWS FLX 3M -B

## 2017-11-21 PROCEDURE — C9290 INJ, BUPIVACAINE LIPOSOME: HCPCS | Performed by: PLASTIC SURGERY

## 2017-11-21 PROCEDURE — 77030031139 HC SUT VCRL2 J&J -A: Performed by: PLASTIC SURGERY

## 2017-11-21 PROCEDURE — 77030002991 HC SUT QUILL SSPC -B: Performed by: PLASTIC SURGERY

## 2017-11-21 PROCEDURE — 77030020061 HC IV BLD WRMR ADMIN SET 3M -B: Performed by: ANESTHESIOLOGY

## 2017-11-21 PROCEDURE — 77030027138 HC INCENT SPIROMETER -A

## 2017-11-21 RX ORDER — NEOSTIGMINE METHYLSULFATE 1 MG/ML
INJECTION INTRAVENOUS AS NEEDED
Status: DISCONTINUED | OUTPATIENT
Start: 2017-11-21 | End: 2017-11-21 | Stop reason: HOSPADM

## 2017-11-21 RX ORDER — HYDROCODONE BITARTRATE AND ACETAMINOPHEN 5; 325 MG/1; MG/1
2 TABLET ORAL
Status: DISCONTINUED | OUTPATIENT
Start: 2017-11-21 | End: 2017-11-22 | Stop reason: HOSPADM

## 2017-11-21 RX ORDER — ONDANSETRON 2 MG/ML
4 INJECTION INTRAMUSCULAR; INTRAVENOUS
Status: DISCONTINUED | OUTPATIENT
Start: 2017-11-21 | End: 2017-11-22 | Stop reason: HOSPADM

## 2017-11-21 RX ORDER — MIDAZOLAM HYDROCHLORIDE 1 MG/ML
1 INJECTION, SOLUTION INTRAMUSCULAR; INTRAVENOUS AS NEEDED
Status: DISCONTINUED | OUTPATIENT
Start: 2017-11-21 | End: 2017-11-21 | Stop reason: HOSPADM

## 2017-11-21 RX ORDER — ONDANSETRON 2 MG/ML
4 INJECTION INTRAMUSCULAR; INTRAVENOUS AS NEEDED
Status: DISCONTINUED | OUTPATIENT
Start: 2017-11-21 | End: 2017-11-21 | Stop reason: HOSPADM

## 2017-11-21 RX ORDER — SODIUM CHLORIDE 9 MG/ML
50 INJECTION, SOLUTION INTRAVENOUS CONTINUOUS
Status: DISCONTINUED | OUTPATIENT
Start: 2017-11-21 | End: 2017-11-21 | Stop reason: HOSPADM

## 2017-11-21 RX ORDER — HYDROMORPHONE HYDROCHLORIDE 1 MG/ML
INJECTION, SOLUTION INTRAMUSCULAR; INTRAVENOUS; SUBCUTANEOUS AS NEEDED
Status: DISCONTINUED | OUTPATIENT
Start: 2017-11-21 | End: 2017-11-21 | Stop reason: HOSPADM

## 2017-11-21 RX ORDER — SCOLOPAMINE TRANSDERMAL SYSTEM 1 MG/1
1.5 PATCH, EXTENDED RELEASE TRANSDERMAL ONCE
Status: DISCONTINUED | OUTPATIENT
Start: 2017-11-21 | End: 2017-11-21 | Stop reason: HOSPADM

## 2017-11-21 RX ORDER — DEXAMETHASONE SODIUM PHOSPHATE 4 MG/ML
INJECTION, SOLUTION INTRA-ARTICULAR; INTRALESIONAL; INTRAMUSCULAR; INTRAVENOUS; SOFT TISSUE AS NEEDED
Status: DISCONTINUED | OUTPATIENT
Start: 2017-11-21 | End: 2017-11-21 | Stop reason: HOSPADM

## 2017-11-21 RX ORDER — ROCURONIUM BROMIDE 10 MG/ML
INJECTION, SOLUTION INTRAVENOUS AS NEEDED
Status: DISCONTINUED | OUTPATIENT
Start: 2017-11-21 | End: 2017-11-21 | Stop reason: HOSPADM

## 2017-11-21 RX ORDER — SODIUM CHLORIDE 0.9 % (FLUSH) 0.9 %
5-10 SYRINGE (ML) INJECTION EVERY 8 HOURS
Status: DISCONTINUED | OUTPATIENT
Start: 2017-11-21 | End: 2017-11-22 | Stop reason: HOSPADM

## 2017-11-21 RX ORDER — HYDROMORPHONE HYDROCHLORIDE 2 MG/ML
0.5 INJECTION, SOLUTION INTRAMUSCULAR; INTRAVENOUS; SUBCUTANEOUS
Status: DISCONTINUED | OUTPATIENT
Start: 2017-11-21 | End: 2017-11-22 | Stop reason: HOSPADM

## 2017-11-21 RX ORDER — SUCCINYLCHOLINE CHLORIDE 20 MG/ML
INJECTION INTRAMUSCULAR; INTRAVENOUS AS NEEDED
Status: DISCONTINUED | OUTPATIENT
Start: 2017-11-21 | End: 2017-11-21 | Stop reason: HOSPADM

## 2017-11-21 RX ORDER — DIPHENHYDRAMINE HCL 25 MG
25 CAPSULE ORAL
Status: DISCONTINUED | OUTPATIENT
Start: 2017-11-21 | End: 2017-11-22 | Stop reason: HOSPADM

## 2017-11-21 RX ORDER — SODIUM CHLORIDE 0.9 % (FLUSH) 0.9 %
5-10 SYRINGE (ML) INJECTION EVERY 8 HOURS
Status: DISCONTINUED | OUTPATIENT
Start: 2017-11-21 | End: 2017-11-21 | Stop reason: HOSPADM

## 2017-11-21 RX ORDER — GLYCOPYRROLATE 0.2 MG/ML
INJECTION INTRAMUSCULAR; INTRAVENOUS AS NEEDED
Status: DISCONTINUED | OUTPATIENT
Start: 2017-11-21 | End: 2017-11-21 | Stop reason: HOSPADM

## 2017-11-21 RX ORDER — DIPHENHYDRAMINE HYDROCHLORIDE 50 MG/ML
12.5 INJECTION, SOLUTION INTRAMUSCULAR; INTRAVENOUS AS NEEDED
Status: DISCONTINUED | OUTPATIENT
Start: 2017-11-21 | End: 2017-11-21 | Stop reason: HOSPADM

## 2017-11-21 RX ORDER — SODIUM CHLORIDE 0.9 % (FLUSH) 0.9 %
5-10 SYRINGE (ML) INJECTION AS NEEDED
Status: DISCONTINUED | OUTPATIENT
Start: 2017-11-21 | End: 2017-11-22 | Stop reason: HOSPADM

## 2017-11-21 RX ORDER — FENTANYL CITRATE 50 UG/ML
50 INJECTION, SOLUTION INTRAMUSCULAR; INTRAVENOUS AS NEEDED
Status: DISCONTINUED | OUTPATIENT
Start: 2017-11-21 | End: 2017-11-21 | Stop reason: HOSPADM

## 2017-11-21 RX ORDER — DEXMEDETOMIDINE HYDROCHLORIDE 4 UG/ML
INJECTION, SOLUTION INTRAVENOUS AS NEEDED
Status: DISCONTINUED | OUTPATIENT
Start: 2017-11-21 | End: 2017-11-21 | Stop reason: HOSPADM

## 2017-11-21 RX ORDER — SODIUM CHLORIDE, SODIUM LACTATE, POTASSIUM CHLORIDE, CALCIUM CHLORIDE 600; 310; 30; 20 MG/100ML; MG/100ML; MG/100ML; MG/100ML
75 INJECTION, SOLUTION INTRAVENOUS CONTINUOUS
Status: DISCONTINUED | OUTPATIENT
Start: 2017-11-21 | End: 2017-11-21 | Stop reason: HOSPADM

## 2017-11-21 RX ORDER — PROPOFOL 10 MG/ML
INJECTION, EMULSION INTRAVENOUS AS NEEDED
Status: DISCONTINUED | OUTPATIENT
Start: 2017-11-21 | End: 2017-11-21 | Stop reason: HOSPADM

## 2017-11-21 RX ORDER — BUPIVACAINE HYDROCHLORIDE AND EPINEPHRINE 2.5; 5 MG/ML; UG/ML
30 INJECTION, SOLUTION EPIDURAL; INFILTRATION; INTRACAUDAL; PERINEURAL ONCE
Status: DISCONTINUED | OUTPATIENT
Start: 2017-11-21 | End: 2017-11-21

## 2017-11-21 RX ORDER — HYDROMORPHONE HYDROCHLORIDE 1 MG/ML
0.2 INJECTION, SOLUTION INTRAMUSCULAR; INTRAVENOUS; SUBCUTANEOUS
Status: DISCONTINUED | OUTPATIENT
Start: 2017-11-21 | End: 2017-11-21 | Stop reason: HOSPADM

## 2017-11-21 RX ORDER — FENTANYL CITRATE 50 UG/ML
25 INJECTION, SOLUTION INTRAMUSCULAR; INTRAVENOUS
Status: DISCONTINUED | OUTPATIENT
Start: 2017-11-21 | End: 2017-11-21 | Stop reason: HOSPADM

## 2017-11-21 RX ORDER — CARBAMAZEPINE 100 MG/1
150 TABLET, CHEWABLE ORAL
Status: DISCONTINUED | OUTPATIENT
Start: 2017-11-21 | End: 2017-11-22 | Stop reason: HOSPADM

## 2017-11-21 RX ORDER — LIDOCAINE HYDROCHLORIDE 20 MG/ML
INJECTION, SOLUTION EPIDURAL; INFILTRATION; INTRACAUDAL; PERINEURAL AS NEEDED
Status: DISCONTINUED | OUTPATIENT
Start: 2017-11-21 | End: 2017-11-21 | Stop reason: HOSPADM

## 2017-11-21 RX ORDER — DEXTROSE, SODIUM CHLORIDE, SODIUM LACTATE, POTASSIUM CHLORIDE, AND CALCIUM CHLORIDE 5; .6; .31; .03; .02 G/100ML; G/100ML; G/100ML; G/100ML; G/100ML
150 INJECTION, SOLUTION INTRAVENOUS CONTINUOUS
Status: DISCONTINUED | OUTPATIENT
Start: 2017-11-21 | End: 2017-11-22 | Stop reason: HOSPADM

## 2017-11-21 RX ORDER — MIDAZOLAM HYDROCHLORIDE 1 MG/ML
INJECTION, SOLUTION INTRAMUSCULAR; INTRAVENOUS AS NEEDED
Status: DISCONTINUED | OUTPATIENT
Start: 2017-11-21 | End: 2017-11-21 | Stop reason: HOSPADM

## 2017-11-21 RX ORDER — ACETAMINOPHEN 10 MG/ML
INJECTION, SOLUTION INTRAVENOUS AS NEEDED
Status: DISCONTINUED | OUTPATIENT
Start: 2017-11-21 | End: 2017-11-21 | Stop reason: HOSPADM

## 2017-11-21 RX ORDER — MIDAZOLAM HYDROCHLORIDE 1 MG/ML
0.5 INJECTION, SOLUTION INTRAMUSCULAR; INTRAVENOUS
Status: DISCONTINUED | OUTPATIENT
Start: 2017-11-21 | End: 2017-11-21 | Stop reason: HOSPADM

## 2017-11-21 RX ORDER — ALBUMIN HUMAN 50 G/1000ML
SOLUTION INTRAVENOUS AS NEEDED
Status: DISCONTINUED | OUTPATIENT
Start: 2017-11-21 | End: 2017-11-21 | Stop reason: HOSPADM

## 2017-11-21 RX ORDER — SODIUM CHLORIDE, SODIUM LACTATE, POTASSIUM CHLORIDE, CALCIUM CHLORIDE 600; 310; 30; 20 MG/100ML; MG/100ML; MG/100ML; MG/100ML
125 INJECTION, SOLUTION INTRAVENOUS CONTINUOUS
Status: DISCONTINUED | OUTPATIENT
Start: 2017-11-21 | End: 2017-11-21 | Stop reason: HOSPADM

## 2017-11-21 RX ORDER — MORPHINE SULFATE 10 MG/ML
2 INJECTION, SOLUTION INTRAMUSCULAR; INTRAVENOUS
Status: DISCONTINUED | OUTPATIENT
Start: 2017-11-21 | End: 2017-11-21

## 2017-11-21 RX ORDER — SODIUM CHLORIDE 9 MG/ML
25 INJECTION, SOLUTION INTRAVENOUS CONTINUOUS
Status: DISCONTINUED | OUTPATIENT
Start: 2017-11-21 | End: 2017-11-21 | Stop reason: HOSPADM

## 2017-11-21 RX ORDER — CEFAZOLIN SODIUM/WATER 2 G/20 ML
2 SYRINGE (ML) INTRAVENOUS
Status: COMPLETED | OUTPATIENT
Start: 2017-11-21 | End: 2017-11-21

## 2017-11-21 RX ORDER — SODIUM CHLORIDE 0.9 % (FLUSH) 0.9 %
5-10 SYRINGE (ML) INJECTION AS NEEDED
Status: DISCONTINUED | OUTPATIENT
Start: 2017-11-21 | End: 2017-11-21 | Stop reason: HOSPADM

## 2017-11-21 RX ORDER — BUPIVACAINE HYDROCHLORIDE AND EPINEPHRINE 2.5; 5 MG/ML; UG/ML
10 INJECTION, SOLUTION EPIDURAL; INFILTRATION; INTRACAUDAL; PERINEURAL ONCE
Status: DISCONTINUED | OUTPATIENT
Start: 2017-11-21 | End: 2017-11-21

## 2017-11-21 RX ORDER — SODIUM CHLORIDE, SODIUM LACTATE, POTASSIUM CHLORIDE, CALCIUM CHLORIDE 600; 310; 30; 20 MG/100ML; MG/100ML; MG/100ML; MG/100ML
INJECTION, SOLUTION INTRAVENOUS
Status: DISCONTINUED | OUTPATIENT
Start: 2017-11-21 | End: 2017-11-21 | Stop reason: HOSPADM

## 2017-11-21 RX ORDER — LORAZEPAM 2 MG/ML
0.5 INJECTION INTRAMUSCULAR
Status: DISCONTINUED | OUTPATIENT
Start: 2017-11-21 | End: 2017-11-22 | Stop reason: HOSPADM

## 2017-11-21 RX ORDER — LIDOCAINE HYDROCHLORIDE 10 MG/ML
0.1 INJECTION, SOLUTION EPIDURAL; INFILTRATION; INTRACAUDAL; PERINEURAL AS NEEDED
Status: DISCONTINUED | OUTPATIENT
Start: 2017-11-21 | End: 2017-11-21 | Stop reason: HOSPADM

## 2017-11-21 RX ORDER — SCOLOPAMINE TRANSDERMAL SYSTEM 1 MG/1
PATCH, EXTENDED RELEASE TRANSDERMAL AS NEEDED
Status: DISCONTINUED | OUTPATIENT
Start: 2017-11-21 | End: 2017-11-21 | Stop reason: HOSPADM

## 2017-11-21 RX ORDER — PHENYLEPHRINE HCL IN 0.9% NACL 0.4MG/10ML
SYRINGE (ML) INTRAVENOUS AS NEEDED
Status: DISCONTINUED | OUTPATIENT
Start: 2017-11-21 | End: 2017-11-21 | Stop reason: HOSPADM

## 2017-11-21 RX ORDER — ONDANSETRON 2 MG/ML
INJECTION INTRAMUSCULAR; INTRAVENOUS AS NEEDED
Status: DISCONTINUED | OUTPATIENT
Start: 2017-11-21 | End: 2017-11-21 | Stop reason: HOSPADM

## 2017-11-21 RX ORDER — ONDANSETRON 4 MG/1
8 TABLET, ORALLY DISINTEGRATING ORAL
Status: DISCONTINUED | OUTPATIENT
Start: 2017-11-21 | End: 2017-11-22 | Stop reason: HOSPADM

## 2017-11-21 RX ORDER — FENTANYL CITRATE 50 UG/ML
INJECTION, SOLUTION INTRAMUSCULAR; INTRAVENOUS AS NEEDED
Status: DISCONTINUED | OUTPATIENT
Start: 2017-11-21 | End: 2017-11-21 | Stop reason: HOSPADM

## 2017-11-21 RX ADMIN — Medication 2 G: at 10:12

## 2017-11-21 RX ADMIN — ROCURONIUM BROMIDE 20 MG: 10 INJECTION, SOLUTION INTRAVENOUS at 11:22

## 2017-11-21 RX ADMIN — FENTANYL CITRATE 50 MCG: 50 INJECTION, SOLUTION INTRAMUSCULAR; INTRAVENOUS at 11:54

## 2017-11-21 RX ADMIN — MIDAZOLAM HYDROCHLORIDE 1 MG: 1 INJECTION, SOLUTION INTRAMUSCULAR; INTRAVENOUS at 16:15

## 2017-11-21 RX ADMIN — FENTANYL CITRATE 100 MCG: 50 INJECTION, SOLUTION INTRAMUSCULAR; INTRAVENOUS at 10:02

## 2017-11-21 RX ADMIN — ACETAMINOPHEN 1000 MG: 10 INJECTION, SOLUTION INTRAVENOUS at 10:36

## 2017-11-21 RX ADMIN — LIDOCAINE HYDROCHLORIDE 80 MG: 20 INJECTION, SOLUTION EPIDURAL; INFILTRATION; INTRACAUDAL; PERINEURAL at 10:02

## 2017-11-21 RX ADMIN — DEXAMETHASONE SODIUM PHOSPHATE 8 MG: 4 INJECTION, SOLUTION INTRA-ARTICULAR; INTRALESIONAL; INTRAMUSCULAR; INTRAVENOUS; SOFT TISSUE at 10:12

## 2017-11-21 RX ADMIN — ROCURONIUM BROMIDE 20 MG: 10 INJECTION, SOLUTION INTRAVENOUS at 12:15

## 2017-11-21 RX ADMIN — PROPOFOL 150 MG: 10 INJECTION, EMULSION INTRAVENOUS at 10:02

## 2017-11-21 RX ADMIN — SODIUM CHLORIDE, SODIUM LACTATE, POTASSIUM CHLORIDE, CALCIUM CHLORIDE, AND DEXTROSE MONOHYDRATE 150 ML/HR: 600; 310; 30; 20; 5 INJECTION, SOLUTION INTRAVENOUS at 19:27

## 2017-11-21 RX ADMIN — ALBUMIN HUMAN 250 ML: 50 SOLUTION INTRAVENOUS at 15:39

## 2017-11-21 RX ADMIN — HYDROMORPHONE HYDROCHLORIDE 0.4 MG: 1 INJECTION, SOLUTION INTRAMUSCULAR; INTRAVENOUS; SUBCUTANEOUS at 16:45

## 2017-11-21 RX ADMIN — ROCURONIUM BROMIDE 10 MG: 10 INJECTION, SOLUTION INTRAVENOUS at 13:41

## 2017-11-21 RX ADMIN — ROCURONIUM BROMIDE 10 MG: 10 INJECTION, SOLUTION INTRAVENOUS at 10:46

## 2017-11-21 RX ADMIN — SCOLOPAMINE TRANSDERMAL SYSTEM 1 PATCH: 1 PATCH, EXTENDED RELEASE TRANSDERMAL at 09:54

## 2017-11-21 RX ADMIN — FENTANYL CITRATE 50 MCG: 50 INJECTION, SOLUTION INTRAMUSCULAR; INTRAVENOUS at 10:58

## 2017-11-21 RX ADMIN — SODIUM CHLORIDE, SODIUM LACTATE, POTASSIUM CHLORIDE, AND CALCIUM CHLORIDE 125 ML/HR: 600; 310; 30; 20 INJECTION, SOLUTION INTRAVENOUS at 09:48

## 2017-11-21 RX ADMIN — ROCURONIUM BROMIDE 35 MG: 10 INJECTION, SOLUTION INTRAVENOUS at 10:12

## 2017-11-21 RX ADMIN — SODIUM CHLORIDE, SODIUM LACTATE, POTASSIUM CHLORIDE, CALCIUM CHLORIDE: 600; 310; 30; 20 INJECTION, SOLUTION INTRAVENOUS at 12:11

## 2017-11-21 RX ADMIN — Medication 2 G: at 14:41

## 2017-11-21 RX ADMIN — Medication 80 MCG: at 15:27

## 2017-11-21 RX ADMIN — SODIUM CHLORIDE, SODIUM LACTATE, POTASSIUM CHLORIDE, CALCIUM CHLORIDE: 600; 310; 30; 20 INJECTION, SOLUTION INTRAVENOUS at 14:38

## 2017-11-21 RX ADMIN — ONDANSETRON 4 MG: 2 INJECTION INTRAMUSCULAR; INTRAVENOUS at 18:52

## 2017-11-21 RX ADMIN — HYDROMORPHONE HYDROCHLORIDE 0.5 MG: 2 INJECTION, SOLUTION INTRAMUSCULAR; INTRAVENOUS; SUBCUTANEOUS at 21:33

## 2017-11-21 RX ADMIN — DEXMEDETOMIDINE HYDROCHLORIDE 30 MCG: 4 INJECTION, SOLUTION INTRAVENOUS at 15:16

## 2017-11-21 RX ADMIN — SUCCINYLCHOLINE CHLORIDE 140 MG: 20 INJECTION INTRAMUSCULAR; INTRAVENOUS at 10:03

## 2017-11-21 RX ADMIN — LORAZEPAM 0.5 MG: 2 INJECTION INTRAMUSCULAR; INTRAVENOUS at 20:14

## 2017-11-21 RX ADMIN — FENTANYL CITRATE 25 MCG: 50 INJECTION, SOLUTION INTRAMUSCULAR; INTRAVENOUS at 16:45

## 2017-11-21 RX ADMIN — FENTANYL CITRATE 25 MCG: 50 INJECTION, SOLUTION INTRAMUSCULAR; INTRAVENOUS at 16:15

## 2017-11-21 RX ADMIN — MIDAZOLAM HYDROCHLORIDE 2 MG: 1 INJECTION, SOLUTION INTRAMUSCULAR; INTRAVENOUS at 09:54

## 2017-11-21 RX ADMIN — HYDROMORPHONE HYDROCHLORIDE 0.5 MG: 1 INJECTION, SOLUTION INTRAMUSCULAR; INTRAVENOUS; SUBCUTANEOUS at 15:50

## 2017-11-21 RX ADMIN — SODIUM CHLORIDE, SODIUM LACTATE, POTASSIUM CHLORIDE, CALCIUM CHLORIDE: 600; 310; 30; 20 INJECTION, SOLUTION INTRAVENOUS at 09:49

## 2017-11-21 RX ADMIN — ROCURONIUM BROMIDE 5 MG: 10 INJECTION, SOLUTION INTRAVENOUS at 10:02

## 2017-11-21 RX ADMIN — HYDROMORPHONE HYDROCHLORIDE 0.4 MG: 1 INJECTION, SOLUTION INTRAMUSCULAR; INTRAVENOUS; SUBCUTANEOUS at 17:30

## 2017-11-21 RX ADMIN — Medication 80 MCG: at 10:52

## 2017-11-21 RX ADMIN — HYDROMORPHONE HYDROCHLORIDE 0.2 MG: 1 INJECTION, SOLUTION INTRAMUSCULAR; INTRAVENOUS; SUBCUTANEOUS at 16:20

## 2017-11-21 RX ADMIN — HYDROMORPHONE HYDROCHLORIDE 0.5 MG: 1 INJECTION, SOLUTION INTRAMUSCULAR; INTRAVENOUS; SUBCUTANEOUS at 15:25

## 2017-11-21 RX ADMIN — FENTANYL CITRATE 50 MCG: 50 INJECTION, SOLUTION INTRAMUSCULAR; INTRAVENOUS at 12:05

## 2017-11-21 RX ADMIN — NEOSTIGMINE METHYLSULFATE 2 MG: 1 INJECTION INTRAVENOUS at 15:21

## 2017-11-21 RX ADMIN — GLYCOPYRROLATE 0.4 MG: 0.2 INJECTION INTRAMUSCULAR; INTRAVENOUS at 15:21

## 2017-11-21 RX ADMIN — HYDROMORPHONE HYDROCHLORIDE 0.5 MG: 1 INJECTION, SOLUTION INTRAMUSCULAR; INTRAVENOUS; SUBCUTANEOUS at 14:15

## 2017-11-21 RX ADMIN — ONDANSETRON 4 MG: 2 INJECTION INTRAMUSCULAR; INTRAVENOUS at 15:21

## 2017-11-21 NOTE — PROGRESS NOTES
Patient arrived on [address] to Room [address] from PACU. Resting quietly in bed in no apparent distress.  at bedside. Patient complained of feeling pressure in chest, sweating , feeling of rapid heartrate. Vital signs 186/98 with heartrate 0f 142. Rapid response called. 2000 notified Dr. Bentley Chua MD on call for Dr. Andria Jolley, of patient's above distress and anxiety. MD ordered Ativan 0.5mg every 8 hours PRN anxiety and requested that I enter the order for him. Will do so. 2037 Dr. Claire Livingston, Radiology, called with chest xray results and requested Dr. Stephanie Tyler be notified. 2040 left message with answering service for Dr. Bentley Chua MD on call for Dr. Stephanie Tyler, to call reference abnormal chest xray results. Await MD return call. 2055 notified Dr Vladislav Parish of chest xray results. He will contact Dr. Stephanie Tyler right away. He said to encourage patient to use incentive spirometer and that he or Dr. Stephanie Tyler would call if they have further orders.

## 2017-11-21 NOTE — PERIOP NOTES
TRANSFER - OUT REPORT:    Verbal report given to Erika Quezada RN(name) on [place]  being transferred to [place] [address] [address] (unit) for routine progression of care       Report consisted of patients Situation, Background, Assessment and   Recommendations(SBAR). Information from the following report(s) SBAR, Kardex, OR Summary, Procedure Summary, Intake/Output, MAR, Accordion and Recent Results was reviewed with the receiving nurse. Lines:   Peripheral IV 11/21/17 Right Wrist (Active)       Peripheral IV 11/21/17 Left Wrist (Active)        Opportunity for questions and clarification was provided. Patient transported with:   Registered Nurse   Family sent to  Room to await patient arrival.  Clothing and glasses on bed for transport with patient. Patient given Incentive Spirometer to use while in room.

## 2017-11-21 NOTE — ROUTINE PROCESS
Patient: David Funes MRN: [id number]  SSN: [id number]   YOB: 1975  Age: 37 y.o. Sex: female     Patient is status post Procedure(s) with comments:  BILATERAL BREAST REDUCTION/LIPOSUCTION BACK, FLANK, ABDOMEN/ABDOMINOPLASTY WITH EXPAREL - adbomen, back, bilateral breast  ABDOMINOPLASTY, hernia repair.     Surgeon(s) and Role:     * Benjamin Phillips MD - Primary    Local/Dose/Irrigation:                    Peripheral IV 11/21/17 Right Wrist (Active)       Peripheral IV 11/21/17 Left Wrist (Active)          Barrera-Spaulding Drain 11/21/17 Left Abdomen (Active)       Barrera-Spaulding Drain 11/21/17 Right Abdomen (Active)      Airway - Endotracheal Tube 11/21/17 Oral (Active)                   Dressing/Packing:  Wound Back-DRESSING TYPE:  (4x4 tape) (11/21/17 1100)  Wound Abdomen-DRESSING TYPE:  ( xeroform,dermabond  4x4 garment) (11/21/17 1100)  Wound Breast-DRESSING TYPE:  (dermabond 4x4 garment) (11/21/17 1300)  Splint/Cast:  ]    Other:

## 2017-11-21 NOTE — BRIEF OP NOTE
BRIEF OPERATIVE NOTE    Date of Procedure: 11/21/2017   Preoperative Diagnosis: MACROMASTIA/COSMETIC  Postoperative Diagnosis: MACROMASTIA/COSMETIC    Procedure(s):  BILATERAL BREAST REDUCTION/LIPOSUCTION BACK, FLANK, ABDOMEN/ABDOMINOPLASTY WITH EXPAREL  ABDOMINOPLASTY, hernia repair  Surgeon(s) and Role:     * Alfonso Mckay MD - Primary         Assistant Staff:  Registered Nurse First Assistant: German Miranda RN    Surgical Staff:  Circ-1: Gillian Ryder RN  Circ-Relief: Giuliana Painter RN  Registered Nurse First Assistant: German Miranda RN  Scrub Tech-1: Leigh Airza  Event Time In   Incision Start 1033   Incision Close 1524     Anesthesia: General   Estimated Blood Loss: 100ml  Specimens:   ID Type Source Tests Collected by Time Destination   1 : hernia preperitoneal fat Fresh Abdomen  Alfonso Mckay MD 11/21/2017 1256 Pathology   2 : right breast tissue Fresh Breast  Alfonso Mckay MD 11/21/2017 1425 Pathology   3 : left breast tissue Fresh Breast  Alfonso Mckay MD 11/21/2017 1446 Pathology      Findings: small port hernia    Complications: none  Implants: * No implants in log *

## 2017-11-21 NOTE — IP AVS SNAPSHOT
[address] 
[phone] Patient: Shara Lopez MRN: [id number] :1975 You are allergic to the following Allergen Reactions Aspirin Anaphylaxis Darvocet A500 (Propoxyphene N-Acetaminophen) Anaphylaxis Percocet (Oxycodone-Acetaminophen) Anaphylaxis Percodan (Oxycodone Hcl-Oxycodone-Asa) Anaphylaxis Recent Documentation Height Weight Breastfeeding? BMI OB Status Smoking Status 1.549 m 76.2 kg No 31.74 kg/m2 Hysterectomy Never Smoker Emergency Contacts  (Rel.) Home Phone Work Phone Mobile Phone Leena Bautista (Spouse) [phone] -- [phone] About your hospitalization You were admitted on:  2017 You last received care in the:  [facility] You were discharged on:  2017 Why you were hospitalized Your primary diagnosis was:  Not on File Your diagnoses also included:  Hypertrophy Of Breast, Encounter For Cosmetic Surgery Providers Seen During Your Hospitalization Provider Specialty Primary office phone Anju Kwon MD Plastic Surgery [phone] Your Primary Care Physician (PCP) Primary Care Physician Office Phone Office Fax OTHER, PHYS ** None ** ** None ** Follow-up Information Follow up With Details Comments Contact Info Swathi Bravo MD   Patient can only remember the practice name and not the physician My Medications TAKE these medications as instructed Instructions Each Dose to Equal  
 Morning Noon Evening Bedtime ADDERALL 15 mg tablet Generic drug:  dextroamphetamine-amphetamine Your last dose was: Your next dose is: Take 20 mg by mouth two (2) times a day. 6 AM & 12 PM  
 20 mg  
    
   
   
   
  
 BENADRYL 25 mg capsule Generic drug:  diphenhydrAMINE Your last dose was: Your next dose is: Take 25 mg by mouth every six (6) hours as needed. 25 mg  
    
   
   
   
  
 diazePAM 5 mg tablet Commonly known as:  VALIUM Your last dose was: Your next dose is: Take 1 Tab by mouth every eight (8) hours as needed (spasm). Max Daily Amount: 15 mg.  
 5 mg HYDROcodone-acetaminophen 5-325 mg per tablet Commonly known as:  Zara Arts Your last dose was: Your next dose is: Take 2 Tabs by mouth every six (6) hours as needed for Pain. Max Daily Amount: 8 Tabs. 2 Tab  
    
   
   
   
  
 multivitamin tablet Commonly known as:  ONE A DAY Your last dose was: Your next dose is: Take 1 Tab by mouth daily. 1 Tab TEGRETOL PO Your last dose was: Your next dose is: Take 150 mg by mouth nightly. 150 mg  
    
   
   
   
  
 TYLENOL SINUS PO Your last dose was: Your next dose is: Take  by mouth as needed. Where to Get Your Medications Information on where to get these meds will be given to you by the nurse or doctor. ! Ask your nurse or doctor about these medications  
  diazePAM 5 mg tablet HYDROcodone-acetaminophen 5-325 mg per tablet Discharge Instructions Ronna Hatchet. Carolynn Chwo M.D. POSTOPERATIVE INSTRUCTIONS 
 
BREAST REDUCTION 1. You must have a responsible adult with you for the first 24 hours after surgery. 2. Please wear loose clothing that does not have to be pulled over your head on the day of surgery 3. Take clear liquids only until any nausea has subsided. You may then resume a normal diet. Avoid greasy or fatty foods for the first 48 hours. 4. Bed rest is recommended for the first 24 hours. You may be up for meals or go to the bathroom WITH ASSISTANCE.   
 
5. Minimize lifting and arm extensions for the first 2 weeks. 6. Take your prescription medication every 3 hours for the first 2-3 days. Then you may take it as needed. You have been prescribed _____________________. 7. Wear your support PATI hose for the first 2-3 days, until you are moving about normally. 8. Do not use any medications containing aspirin or ibuprofen for 4-5 days. You may take non aspirin non ibuprofen headache medications such as Tylenol Extra Strength. You may resume your normal medications. Do not consume alcohol or tranquilizers and sleeping pills while on pain medications. 9. Do not drive a car or operate machinery while you are taking the pain medication. You may return to work in _____________________. You may resume driving in ____________ when no longer on pain medication. 10. You may shower after 24 hours. Replace dry dressing and garment. Wear the garment for 24 hours a day for ________ days. Keep your dressing clean and dry. 11. Apply ice to the area for the first 24 hours. 12. Drains  If you have a drain, return to the clinic the next day for drain removal. 
 
13. You will need to return to the office for your postoperative appointment in two weeks and any outside sutures will be removed then, or they will dissolve. Your appointment is _____________. 14. If you have increasing pain and swelling on one side or the other, with the feeling of tightness, call the office at [phone]. Please call the office at [phone] if you have any questions after your surgery. 16. COMPLICATIONS Call the office at [phone] if any of the following occur Fever over 101° taken by mouth or 100° taken rectally Pain not relieved by medications prescribed Blood soaked dressing (small amounts of oozing may be normal) Unable to urinate or completely empty bladder Nausea or vomiting lasting longer than 24 hours Numbness, tingling or cold fingers or toes Swelling around incision  Increasing and progressive drainage from incision or exam site Increased redness, warmth or hardness around incision or exam site If you experience shortness of breath, chest pain or swelling of legs or feet, go immediately to the emergency room IF YOU NEED IMMEDIATE ATTENTION, GO TO THE EMERGENCY ROOM OF [address] [place] [address]. I acknowledge receipt of the above discharge instructions: 
 
Patient/Guardian Signature _______________________________________ Date___________________ Nurses Signature_______________________________________________ Date___________________ Christiana Colunga. Anand Chaves M.D. POSTOPERATIVE INSTRUCTIONS 
 
ABDOMINOPLASTY 1. You must have a responsible adult with you for the first 24 hours after surgery. 2. Please wear loose, comfortable clothing the day of surgery. Avoid clothing that goes over your head. 3. You may start clear liquids once you have arrived home and continue until any nausea has subsided. Then you may advance to a normal diet. Greasy and spicy foods are not advised. 4. You may be up for meals or go to the bathroom with assistance. You should get up every two hours while awake and walk to improve circulation. Keep your PATI hose on until you are moving about normally, at least 48 hours. 5. For comfort while sleeping, we suggest sleeping with 2-3 pillows under your back and head, and 2-3 pillows under your knees. 6. You may apply ice to the incision for the first 24 hours. Frozen Peas work best.  NO HEATING PADS ALLOWED 7. You may change your dressing if wet or soiled. Call if dressings are blood soaked. 8. If you have a drain, follow the drain care instructions. You must call for an appointment to have drain removed when drainage is less than _____cc in a 24 hour period. 9. You may shower the day after surgery. If you have a drain, wrap the string through the drain reservoir and hand it around your neck.   Replace a light dressing and the binder afterward. 10. Wear your garment when out of bed for the first two weeks. You may wash and dry and re-wear the garment. 11. No heavy lifting or strenuous activities until you are seen back in the office. No lifting over 10 pounds for a total of 4 weeks. 12. You will need to return to the office in 2 weeks for your checkup. You will be instructed on your activity level at that time. Your follow up appointment is scheduled for _________________. You may return to work in ____________. You may resume driving in _____________________ when no longer on pain medication. 13. You will feel tight and may not be able to stand up straight for several weeks. This is normal and is due to the muscles being sutured. 14. You will have discomfort for several days. Take your prescription medication as directed. You have been prescribed _____________________ . Do not use any medication containing Aspirin or Ibuprofen until cleared by your surgeon. You may take non aspirin or non ibuprofen headache medication such as Tylenol Extra Strength. You may resume your daily medications. 15. Do not drive a car or operate machinery while you are taking pain medication. Do not drink alcohol or take tranquilizers or sleeping pills while on pain medication. If you have increasing pain and swelling on one side or the other, with the feeling of tightness, call the office at [phone]. Please call the office at [phone] if you have any questions after your surgery. 17. COMPLICATIONS Call the office at [phone] if any of the following occur: 
Fever over 101° taken by mouth or over 100° taken rectally Pain not relieved by medication prescribed Blood soaked dressing (small amounts of oozing may be normal) Unable to urinate or completely empty your bladder Nausea and vomiting lasting longer than 24 hours Numbness, tingling or cold fingers or toes Swelling around incision Increasing and progressive drainage from incision or exam site Increased redness, warmth and hardness around the exam site or incision If you experience shortness of breath, chest pain or swelling of legs or feet, go immediately to the emergency room 25. IF YOU HAVE NEED OF IMMEDIATE ATTENTION, GO TO THE EMERGENCY ROOM OF [address] [place] [address]. I acknowledge receipt of the above discharge instructions: 
 
Patient/Guardian Signature _______________________________________ Date___________________ Nurses Signature_______________________________________________ Date___________________ Discharge Orders None Introducing [facility]! [facility] introduces [employer] patient portal. Now you can access parts of your medical record, email your doctor's office, and request medication refills online. 1. In your internet browser, go to [email or web address] 2. Click on the First Time User? Click Here link in the Sign In box. You will see the New Member Sign Up page. 3. Enter your [employer] Access Code exactly as it appears below. You will not need to use this code after youve completed the sign-up process. If you do not sign up before the expiration date, you must request a new code. · [employer] Access Code: [email or web address] Expires: 2/20/2018 10:55 AM 
 
4. Enter the last four digits of your Social Security Number (xxxx) and Date of Birth (mm/dd/yyyy) as indicated and click Submit. You will be taken to the next sign-up page. 5. Create a [employer] ID. This will be your [employer] login ID and cannot be changed, so think of one that is secure and easy to remember. 6. Create a [employer] password. You can change your password at any time. 7. Enter your Password Reset Question and Answer. This can be used at a later time if you forget your password. 8. Enter your e-mail address. You will receive e-mail notification when new information is available in [address] 9. Click Sign Up.  You can now view and download portions of your medical record. 10. Click the Download Summary menu link to download a portable copy of your medical information. If you have questions, please visit the Frequently Asked Questions section of the [employer] website. Remember, [employer] is NOT to be used for urgent needs. For medical emergencies, dial 911. Now available from your iPhone and Android! General Information Please provide this summary of care documentation to your next provider. Patient Signature:  ____________________________________________________________ Date:  ____________________________________________________________  
  
Earnstine Hany Provider Signature:  ____________________________________________________________ Date:  ____________________________________________________________

## 2017-11-21 NOTE — ANESTHESIA POSTPROCEDURE EVALUATION
Post-Anesthesia Evaluation and Assessment    Patient: Albert Goldberg MRN: [id number]  SSN: [id number]    YOB: 1975  Age: 37 y.o. Sex: female       Cardiovascular Function/Vital Signs  Visit Vitals    /62    Pulse 86    Temp 36.8 °C (98.3 °F)    Resp 22    Ht 5' 1\" (1.549 m)    Wt 76.2 kg (168 lb)    SpO2 96%    BMI 31.74 kg/m2       Patient is status post general anesthesia for Procedure(s):  BILATERAL BREAST REDUCTION/LIPOSUCTION BACK, FLANK, ABDOMEN/ABDOMINOPLASTY WITH EXPAREL  ABDOMINOPLASTY, hernia repair. Nausea/Vomiting: None    Postoperative hydration reviewed and adequate. Pain:  Pain Scale 1: Numeric (0 - 10) (11/21/17 4112)  Pain Intensity 1: 0 (11/21/17 0821)   Managed    Neurological Status:   Neuro (WDL): Within Defined Limits (11/21/17 0818)   At baseline    Mental Status and Level of Consciousness: Arousable    Pulmonary Status:   O2 Device: Room air (11/21/17 1608)   Adequate oxygenation and airway patent    Complications related to anesthesia: None    Post-anesthesia assessment completed.  No concerns    Signed By: Dom Anderson MD     November 21, 2017

## 2017-11-21 NOTE — H&P
Surgery History and Physical    Subjective:      Alejandro Viveros is a 37 y.o.  female who presents with symptomatic macromastia and excess abdominal skin and fat. There are no active problems to display for this patient. Past Medical History:   Diagnosis Date    ADHD (attention deficit hyperactivity disorder)     Anemia     Asthma     HX OF IT  DURING SEASONAL ALLERGIES. NO FLARE UP \"IN A LONG TIME\"    Insomnia     Migraine     Nausea & vomiting     NAUSEA    Obesity     PE (pulmonary embolism) 2009    POST GASTRIC BY PASS      Past Surgical History:   Procedure Laterality Date    HX CHOLECYSTECTOMY      HX GASTRIC BYPASS  2009    HX GYN      BILATERAL TUBAL LIGATION     HX HEENT      TONSILLECTOMY    HX HEENT      EXTRACTION OF WISDOM TEETH X 4    HX HERNIA REPAIR      HX HYSTERECTOMY        Social History   Substance Use Topics    Smoking status: Never Smoker    Smokeless tobacco: Never Used    Alcohol use No      Family History   Problem Relation Age of Onset    Diabetes Mother     Hypertension Mother     Alcohol abuse Father       Prior to Admission medications    Medication Sig Start Date End Date Taking? Authorizing Provider   CARBAMAZEPINE (TEGRETOL PO) Take 150 mg by mouth nightly. Historical Provider   PSEUDOEPHEDRINE/ACETAMINOPHEN (TYLENOL SINUS PO) Take  by mouth as needed. Historical Provider   diphenhydrAMINE (BENADRYL) 25 mg capsule Take 25 mg by mouth every six (6) hours as needed. Historical Provider   multivitamin (ONE A DAY) tablet Take 1 Tab by mouth daily. Historical Provider   dextroamphetamine-amphetamine (ADDERALL) 15 mg tablet Take 20 mg by mouth two (2) times a day.   6 AM & 12 PM          Swathi Bravo, MD     Allergies   Allergen Reactions    Aspirin Anaphylaxis    Darvocet A500 [Propoxyphene N-Acetaminophen] Anaphylaxis    Percocet [Oxycodone-Acetaminophen] Anaphylaxis    Percodan [Oxycodone Hcl-Oxycodone-Asa] Anaphylaxis Review of Systems:    A comprehensive review of systems was negative except for that written in the History of Present Illness. Objective:     Patient Vitals for the past 8 hrs:   BP Temp Resp SpO2 Height Weight   17 0821 120/80 97.1 °F (36.2 °C) 18 100 % 5' 1\" (1.549 m) 76.2 kg (168 lb)       Temp (24hrs), Av.1 °F (36.2 °C), Min:97.1 °F (36.2 °C), Max:97.1 °F (36.2 °C)      Physical Exam:  General:  Alert, cooperative, no distress, appears stated age. Eyes:  Conjunctivae/corneas clear. PERRL, EOMs intact. Fundi benign   Ears:  Normal TMs and external ear canals both ears. Nose: Nares normal. Septum midline. Mucosa normal. No drainage or sinus tenderness. Mouth/Throat: Lips, mucosa, and tongue normal. Teeth and gums normal.   Neck: Supple, symmetrical, trachea midline, no adenopathy, thyroid: no enlargment/tenderness/nodules, no carotid bruit and no JVD. Back:   Symmetric, no curvature. ROM normal. No CVA tenderness. Lungs:   Clear to auscultation bilaterally. Heart:  Regular rate and rhythm, S1, S2 normal, no murmur, click, rub or gallop. Abdomen:   Soft, non-tender. Bowel sounds normal. No masses,  No organomegaly. Extremities: Extremities normal, atraumatic, no cyanosis or edema. Pulses: 2+ and symmetric all extremities. Skin: Skin color, texture, turgor normal. No rashes or lesions   Lymph nodes: Cervical, supraclavicular, and axillary nodes normal.   Neurologic: CNII-XII intact. Normal strength, sensation and reflexes throughout. Labs: No results found for this or any previous visit (from the past 24 hour(s)). Data Review:    CBC:   Lab Results   Component Value Date/Time    WBC 7.3 11/15/2017 02:47 PM    RBC 4.92 11/15/2017 02:47 PM    HGB 13.4 11/15/2017 02:47 PM    HCT 42.0 11/15/2017 02:47 PM    PLATELET 075  02:47 PM        Assessment:     Active Problems:    * No active hospital problems.  *      Plan:     Breast reduction and lipo of back, flank, and abdomen with abdominoplasty    Signed By: Bharat Stoddard MD     November 21, 2017

## 2017-11-21 NOTE — PERIOP NOTES
1800: patient c/o \"gas\" pain in upper chest upon wakening. Pt repositioned in bed and stated she had had this type of pain in past post op surgical situations. Upon arrival to #620- pt c/o pain in her scapula and reiterated that \"this is what I was afraid of, that gas pain\"  Patient remains in semi-fowlers position, coughing, deep breathing. -O2 saturations at % on room air.  at bedside for emotional support. Pt dozing in bed with  holding hand. Patient care tech in room to check vitals.

## 2017-11-21 NOTE — PROGRESS NOTES
TRANSFER - IN REPORT:    Verbal report received from Ayala Coombs RN(name) on Beny Negrete  being received from [facility]) for routine post - op      Report consisted of patients Situation, Background, Assessment and   Recommendations(SBAR). Information from the following report(s) SBAR and OR Summary was reviewed with the receiving nurse. Opportunity for questions and clarification was provided. Await patient arrival on [address] to Room 620.

## 2017-11-21 NOTE — ANESTHESIA PREPROCEDURE EVALUATION
Anesthetic History   No history of anesthetic complications            Review of Systems / Medical History  Patient summary reviewed, nursing notes reviewed and pertinent labs reviewed    Pulmonary  Within defined limits          Asthma        Neuro/Psych   Within defined limits           Cardiovascular  Within defined limits                     GI/Hepatic/Renal  Within defined limits              Endo/Other  Within defined limits      Obesity     Other Findings              Physical Exam    Airway  Mallampati: II  TM Distance: > 6 cm  Neck ROM: normal range of motion   Mouth opening: Normal     Cardiovascular  Regular rate and rhythm,  S1 and S2 normal,  no murmur, click, rub, or gallop             Dental  No notable dental hx       Pulmonary  Breath sounds clear to auscultation               Abdominal  GI exam deferred       Other Findings            Anesthetic Plan    ASA: 2  Anesthesia type: general          Induction: Intravenous  Anesthetic plan and risks discussed with: Patient

## 2017-11-22 VITALS
SYSTOLIC BLOOD PRESSURE: 129 MMHG | DIASTOLIC BLOOD PRESSURE: 81 MMHG | OXYGEN SATURATION: 99 % | RESPIRATION RATE: 18 BRPM | HEART RATE: 93 BPM | HEIGHT: 61 IN | BODY MASS INDEX: 31.72 KG/M2 | WEIGHT: 167.99 LBS | TEMPERATURE: 98.7 F

## 2017-11-22 LAB
ATRIAL RATE: 128 BPM
CALCULATED P AXIS, ECG09: 33 DEGREES
CALCULATED R AXIS, ECG10: 10 DEGREES
CALCULATED T AXIS, ECG11: 17 DEGREES
DIAGNOSIS, 93000: NORMAL
P-R INTERVAL, ECG05: 162 MS
Q-T INTERVAL, ECG07: 286 MS
QRS DURATION, ECG06: 74 MS
QTC CALCULATION (BEZET), ECG08: 417 MS
VENTRICULAR RATE, ECG03: 128 BPM

## 2017-11-22 PROCEDURE — 74011250636 HC RX REV CODE- 250/636: Performed by: PLASTIC SURGERY

## 2017-11-22 PROCEDURE — 99218 HC RM OBSERVATION: CPT

## 2017-11-22 PROCEDURE — 74011250637 HC RX REV CODE- 250/637: Performed by: PLASTIC SURGERY

## 2017-11-22 PROCEDURE — 74011258636 HC RX REV CODE- 258/636: Performed by: PLASTIC SURGERY

## 2017-11-22 PROCEDURE — 77030012890

## 2017-11-22 RX ORDER — DIAZEPAM 5 MG/1
5 TABLET ORAL
Qty: 30 TAB | Refills: 1 | Status: SHIPPED | OUTPATIENT
Start: 2017-11-22

## 2017-11-22 RX ORDER — HYDROCODONE BITARTRATE AND ACETAMINOPHEN 5; 325 MG/1; MG/1
2 TABLET ORAL
Qty: 50 TAB | Refills: 0 | Status: SHIPPED | OUTPATIENT
Start: 2017-11-22

## 2017-11-22 RX ADMIN — HYDROCODONE BITARTRATE AND ACETAMINOPHEN 2 TABLET: 5; 325 TABLET ORAL at 06:11

## 2017-11-22 RX ADMIN — HYDROCODONE BITARTRATE AND ACETAMINOPHEN 2 TABLET: 5; 325 TABLET ORAL at 10:11

## 2017-11-22 RX ADMIN — HYDROMORPHONE HYDROCHLORIDE 0.5 MG: 2 INJECTION, SOLUTION INTRAMUSCULAR; INTRAVENOUS; SUBCUTANEOUS at 00:00

## 2017-11-22 RX ADMIN — ONDANSETRON 8 MG: 4 TABLET, ORALLY DISINTEGRATING ORAL at 00:00

## 2017-11-22 RX ADMIN — LORAZEPAM 0.5 MG: 2 INJECTION INTRAMUSCULAR; INTRAVENOUS at 09:08

## 2017-11-22 RX ADMIN — SODIUM CHLORIDE, SODIUM LACTATE, POTASSIUM CHLORIDE, CALCIUM CHLORIDE, AND DEXTROSE MONOHYDRATE 150 ML/HR: 600; 310; 30; 20; 5 INJECTION, SOLUTION INTRAVENOUS at 00:18

## 2017-11-22 RX ADMIN — Medication 10 ML: at 06:11

## 2017-11-22 RX ADMIN — HYDROMORPHONE HYDROCHLORIDE 0.5 MG: 2 INJECTION, SOLUTION INTRAMUSCULAR; INTRAVENOUS; SUBCUTANEOUS at 03:21

## 2017-11-22 NOTE — CDMP QUERY
Please give the clinical significance of the following lab data    Results for Sameer Fitzgerald (MRN [id number]) as of 11/22/2017 08:32       Ref. Range 11/21/2017 19:57   WBC Latest Ref Range: 3.6 - 11.0 K/uL 28.2 (H)   RBC Latest Ref Range: 3.80 - 5.20 M/uL 4.14   HGB Latest Ref Range: 11.5 - 16.0 g/dL 11.3 (L)   HCT Latest Ref Range: 35.0 - 47.0 % 35.4   MCV Latest Ref Range: 80.0 - 99.0 FL 85.5   MCH Latest Ref Range: 26.0 - 34.0 PG 27.3   MCHC Latest Ref Range: 30.0 - 36.5 g/dL 31.9   RDW Latest Ref Range: 11.5 - 14.5 % 13.9   PLATELET Latest Ref Range: 150 - 400 K/uL 257   NEUTROPHILS Latest Ref Range: 32 - 75 % 84 (H)   BAND NEUTROPHILS Latest Ref Range: 0 - 6 % 3   LYMPHOCYTES Latest Ref Range: 12 - 49 % 8 (L)   MONOCYTES Latest Ref Range: 5 - 13 % 5   EOSINOPHILS Latest Ref Range: 0 - 7 % 0   BASOPHILS Latest Ref Range: 0 - 1 % 0   DF Latest Units:   MANUAL   ABS. NEUTROPHILS Latest Ref Range: 1.8 - 8.0 K/UL 24.5 (H)   ABS. LYMPHOCYTES Latest Ref Range: 0.8 - 3.5 K/UL 2.3   ABS. MONOCYTES Latest Ref Range: 0.0 - 1.0 K/UL 1.4 (H)   ABS. EOSINOPHILS Latest Ref Range: 0.0 - 0.4 K/UL 0.0   ABS. BASOPHILS Latest Ref Range: 0.0 - 0.1 K/UL 0.0         Please clarify and document your clinical opinion in the progress notes and discharge summary including the definitive and/or presumptive diagnosis, (suspected or probable), related to the above clinical findings. Please include clinical findings supporting your diagnosis.       Thank you,  Eugene Claudio, BSN, RN, [address]  [phone]

## 2017-11-22 NOTE — PROGRESS NOTES
Patient with panic attack overnight  Now doing fine  Appropriately tender  Nipples warm   Once she voids can dc home

## 2017-11-22 NOTE — PROGRESS NOTES
11/21/17 2004   Vital Signs   Temp 97.9 °F (36.6 °C)   Pulse (Heart Rate) (!) 117   Resp Rate 20   Level of Consciousness Alert   /88   MEWS Score 3   md aware will continue to monitor    Primary Nurse brianda RN performed a dual skin assessment on this patient Impairment noted- post op patient of cosmetic surgery. Wearing body suit with dsgs poking out of bra on both breasts and dsg to upper back with moderate bloody drainage noted. adalberto drains x2 with bloody drainage.  No other impairments noted  Bruno score is 19

## 2017-11-22 NOTE — PROGRESS NOTES
Gave DC instructions to patient and spouse. Gave her 2 prescriptions, 1 for Norco and 1 for Valium. Patient understands that she needs to follow up with Dr. Adithya Khan in 1-2 weeks.

## 2017-11-22 NOTE — PROGRESS NOTES
Problem: Falls - Risk of  Goal: *Absence of Falls  Document Liu Fall Risk and appropriate interventions in the flowsheet.    Outcome: Progressing Towards Goal  Fall Risk Interventions:            Medication Interventions: Evaluate medications/consider consulting pharmacy, Patient to call before getting OOB, Teach patient to arise slowly    Elimination Interventions: Call light in reach, Patient to call for help with toileting needs

## 2017-11-22 NOTE — PROGRESS NOTES
9:13 AM  Patient reviewed in rounds. CM notified that patient has orders for discharge. There are no CM consults or needs. Patient to discharge home with family assistance. Mode of transport at time of discharge to be provided by patient's spouse who is present in patient's room.     CARLY Palomino/SERGIO

## 2017-11-22 NOTE — OP NOTES
[address]   [address] [place] [address], [address]   OP NOTE       Name:  Sravani Luis   MR#:  [id number]   :  1975   Account #:  [de-identified]    Surgery Date:  2017   Date of Adm:  2017       PREOPERATIVE DIAGNOSES   1. Symptomatic macromastia. 2. Excess skin, abdomen, back and flanks. POSTOPERATIVE DIAGNOSIS:     PROCEDURES PERFORMED   1. Breast reduction. 2. Cosmetic abdominoplasty. 3. Liposuction of abdomen, back and flanks. ANESTHESIA: General.    ESTIMATED BLOOD LOSS: 100 mL. DRAINS AND TUBES: Baltazar drain x2, Barrow x1. SPECIMENS REMOVED: Left breast and the right breast, weight   of the tissue was 525 on the left, 483 on the right. INDICATIONS: The patient is a 50-year-old [facility] American female   who is interested in some body contouring of her abdomen as well   as increased breast reduction to help some symptomatic macromastia. Prior to procedure, risks and benefits were discussed with the patient,   informed consent obtained. On the day of surgery, the patient was met   in the preop holding, she was marked in the standing position for a   modified Wise pattern superior medial pedicle breast reduction as well   as extended abdominoplasty coupled with liposuction of upper   abdomen, the flanks and the back. DESCRIPTION OF PROCEDURE: She was then taken to the   operating room, intubated in the supine position, a Barrow catheter was   placed. SCDs were on her legs. She was then flipped on to the prone   position on to the table and all bony prominences were padded. Following prep and drape, we began the case. Preoperative time-out   confirming the patient's identification, procedure to be performed. I then made stab incisions in the lower back, infiltrated 2000 mL in the   left flank, 2000 mL in the right flank, 500 mL in the left back, 500 in the   right back. I then began suction with a 4-mm cannula, followed by a 3   mm cannula.  The total amount extracted was 2000 mL from each   flank, 500 mL from each back region. We tested for symmetry visually   as well as by manual palpation. The access incisions were then closed   using interrupted 4-0 plain gut. The patient was flipped in the supine   position on the operating room table. The arms secured 90 degrees to   arm boards. The chest, abdomen and pelvis were prepped and draped   into the field. I made stab incisions in the lower abdomen, infiltrated the abdomen   with Patrici Comment solution infiltrating a total of 2 liters into the upper abdomen   as well as my incision line. I then suctioned another 1500 mL of   lipoaspirate from the upper abdomen. Once this was accomplished, I   made my lower abdominal incision and split the flap in the midline, and   circumscribed and protected the umbilicus. Once this occurred, I was   then unable to do superficial dissection. Incidental finding was that she   had a port site hernia from previous laparoscopic repair that was just   cephalad to the umbilicus. This was identified, the contents were   reduced. It proved to be preperitoneal fat and the defect was   reapproximated using interrupted 2-0 PDS sutures placed in figure-of-  eight fashion. I then dissected from the level of the umbilicus up to the   xiphoid taking the medial row perforators, leaving the lateral row intact. I then dissected the lower abdomen. The table was then flexed. I could   actually get a good correction in this fashion. I then did double running   layer of #1 double looped PDS going from umbilicus to xiphoid and   back, followed by from the umbilicus to the pubis and back. This was   reinforced with a few figure-of-eight 0 PDS sutures placed at the   proximal and distal aspects of the incision. I then infiltrated the fascia   with Exparel for postop analgesia. Once this was accomplished, the   table was flexed. I cut my abdominoplasty flap.   .  I changed to I then   closed in layers with running PDS suture for Lazara's and running   subcuticular 2-0 Monoderm for the skin. Prior to closure, I exteriorized   a 15-Slovak drain through the lateral aspects of incisions on both sides   and these were secured using 2-0 nylon. Then turned my attention to the breasts, infiltrated the breasts with   Everlena Brood solution, I then circumscribed   the nipple-areolar complexes at 45 mm. I de-epithelialized the   superomedial pedicle and then isolated the pedicle down to the chest wall. I then   performed en bloc resection from the inferomedial, inferolateral, and superolateral quadrants. Total   weight of specimen on the right 483, on the left was 525. Compared symmetry, which was good. We then completed closure by   reapproximating the trifurcation with interrupted 2-0 Vicryl, the nipple areolar opening with 2-0   Vicryl, and then running subcuticular 2-0 Monocryl for the   inframammary incision and a running subcuticular 2-0   Monoderm Stratafix suture for the nipple-areolar complex vertical   incision. At the end of the case, all sponge and needle counts were   correct. The patient was placed into a garment, extubated without   event. The Barrow catheter was left in place. Drains were on and   running.         MD MEIR Smith / MP   D:  11/21/2017   15:51   T:  11/22/2017   00:53   Job #:  [id number]

## 2017-11-22 NOTE — DISCHARGE INSTRUCTIONS
Chasity Lindsey. Willy Rosario M.D. POSTOPERATIVE INSTRUCTIONS    BREAST REDUCTION      1. You must have a responsible adult with you for the first 24 hours after surgery. 2. Please wear loose clothing that does not have to be pulled over your head on the day of surgery    3. Take clear liquids only until any nausea has subsided. You may then resume a normal diet. Avoid greasy or fatty foods for the first 48 hours. 4. Bed rest is recommended for the first 24 hours. You may be up for meals or go to the bathroom WITH ASSISTANCE. 5. Minimize lifting and arm extensions for the first 2 weeks. 6. Take your prescription medication every 3 hours for the first 2-3 days. Then you may take it as needed. You have been prescribed _____________________. 7. Wear your support PATI hose for the first 2-3 days, until you are moving about normally. 8. Do not use any medications containing aspirin or ibuprofen for 4-5 days. You may take non aspirin non ibuprofen headache medications such as Tylenol Extra Strength. You may resume your normal medications. Do not consume alcohol or tranquilizers and sleeping pills while on pain medications. 9. Do not drive a car or operate machinery while you are taking the pain medication. You may return to work in _____________________. You may resume driving in ____________ when no longer on pain medication. 10. You may shower after 24 hours. Replace dry dressing and garment. Wear the garment for 24 hours a day for ________ days. Keep your dressing clean and dry. 11. Apply ice to the area for the first 24 hours. 12. Drains - If you have a drain, return to the clinic the next day for drain removal.    13. You will need to return to the office for your postoperative appointment in two weeks and any outside sutures will be removed then, or they will dissolve. Your appointment is _____________.     14. If you have increasing pain and swelling on one side or the other, with the feeling of tightness, call the office at [phone]. Please call the office at [phone] if you have any questions after your surgery. 16. COMPLICATIONS    Call the office at [phone] if any of the following occur  Fever over 101° taken by mouth or 100° taken rectally  Pain not relieved by medications prescribed  Blood soaked dressing (small amounts of oozing may be normal)   Unable to urinate or completely empty bladder   Nausea or vomiting lasting longer than 24 hours   Numbness, tingling or cold fingers or toes   Swelling around incision   Increasing and progressive drainage from incision or exam site   Increased redness, warmth or hardness around incision or exam site  If you experience shortness of breath, chest pain or swelling of legs or feet, go immediately to the emergency room      IF YOU NEED IMMEDIATE ATTENTION, GO TO [address]. I acknowledge receipt of the above discharge instructions:    Patient/Guardian Signature _______________________________________ Date___________________    Brown Query Signature_______________________________________________ Date___________________           Lupis Mcdonald. Tiffany Andersen M.D. POSTOPERATIVE INSTRUCTIONS    ABDOMINOPLASTY    1. You must have a responsible adult with you for the first 24 hours after surgery. 2. Please wear loose, comfortable clothing the day of surgery. Avoid clothing that goes over your head. 3. You may start clear liquids once you have arrived home and continue until any nausea has subsided. Then you may advance to a normal diet. Greasy and spicy foods are not advised. 4. You may be up for meals or go to the bathroom with assistance. You should get up every two hours while awake and walk to improve circulation. Keep your PATI hose on until you are moving about normally, at least 48 hours.     5. For comfort while sleeping, we suggest sleeping with 2-3 pillows under your back and head, and 2-3 pillows under your knees. 6. You may apply ice to the incision for the first 24 hours. Frozen Peas work best.  NO HEATING PADS ALLOWED    7. You may change your dressing if wet or soiled. Call if dressings are blood soaked. 8. If you have a drain, follow the drain care instructions. You must call for an appointment to have drain removed when drainage is less than _____cc in a 24 hour period. 9. You may shower the day after surgery. If you have a drain, wrap the string through the drain reservoir and hand it around your neck. Replace a light dressing and the binder afterward. 10. Wear your garment when out of bed for the first two weeks. You may wash and dry and re-wear the garment. 11. No heavy lifting or strenuous activities until you are seen back in the office. No lifting over 10 pounds for a total of 4 weeks. 12. You will need to return to the office in 2 weeks for your checkup. You will be instructed on your activity level at that time. Your follow up appointment is scheduled for _________________. You may return to work in ____________. You may resume driving in _____________________ when no longer on pain medication. 13. You will feel tight and may not be able to stand up straight for several weeks. This is normal and is due to the muscles being sutured. 14. You will have discomfort for several days. Take your prescription medication as directed. You have been prescribed _____________________ . Do not use any medication containing Aspirin or Ibuprofen until cleared by your surgeon. You may take non aspirin or non ibuprofen headache medication such as Tylenol Extra Strength. You may resume your daily medications. 15. Do not drive a car or operate machinery while you are taking pain medication. Do not drink alcohol or take tranquilizers or sleeping pills while on pain medication.      If you have increasing pain and swelling on one side or the other, with the feeling of tightness, call the office at [phone]. Please call the office at [phone] if you have any questions after your surgery. 17. COMPLICATIONS    Call the office at [phone] if any of the following occur:  Fever over 101° taken by mouth or over 100° taken rectally  Pain not relieved by medication prescribed  Blood soaked dressing (small amounts of oozing may be normal)  Unable to urinate or completely empty your bladder  Nausea and vomiting lasting longer than 24 hours  Numbness, tingling or cold fingers or toes  Swelling around incision  Increasing and progressive drainage from incision or exam site  Increased redness, warmth and hardness around the exam site or incision  If you experience shortness of breath, chest pain or swelling of legs or feet, go immediately to the emergency room    18. IF YOU HAVE NEED OF IMMEDIATE ATTENTION, GO TO [address].      I acknowledge receipt of the above discharge instructions:    Patient/Guardian Signature _______________________________________ Date___________________    Fraser Earing Signature_______________________________________________ Date___________________

## 2017-11-24 NOTE — CDMP QUERY
The Medical record notes a Chest Xray was completed on 11/21/2017 @ 8pm. The Chest X-ray was found to have the following:    Impression: Low Lung Volumes with Bibasilar atelectasis. Small Pneumoperitoneum, possibly postoperative in etiology. Please note, the fDiagnostic studies can not be used for inpatient coding. Based on your medical judgement , can you please indicate in the medical record if the patient acutally had Bibasilar atelectasis & Small Pneumoperitoneum possibly postoperative in etiology.     Thank you,  Giuliana Jara, BSN, RN, [address]  [phone]

## 2017-11-26 ENCOUNTER — HOSPITAL ENCOUNTER (EMERGENCY)
Age: 42
Discharge: HOME | End: 2017-11-26
Attending: EMERGENCY MEDICINE | Admitting: EMERGENCY MEDICINE
Payer: COMMERCIAL

## 2017-11-26 VITALS
HEART RATE: 99 BPM | SYSTOLIC BLOOD PRESSURE: 109 MMHG | DIASTOLIC BLOOD PRESSURE: 69 MMHG | BODY MASS INDEX: 30.96 KG/M2 | OXYGEN SATURATION: 100 % | WEIGHT: 164 LBS | RESPIRATION RATE: 20 BRPM | TEMPERATURE: 98.5 F | HEIGHT: 61 IN

## 2017-11-26 DIAGNOSIS — N39.0 URINARY TRACT INFECTION WITHOUT HEMATURIA, SITE UNSPECIFIED: ICD-10-CM

## 2017-11-26 DIAGNOSIS — R06.2 WHEEZING: Primary | ICD-10-CM

## 2017-11-26 LAB
APPEARANCE UR: ABNORMAL
BACTERIA URNS QL MICRO: ABNORMAL /HPF
BILIRUB UR QL: NEGATIVE
COLOR UR: ABNORMAL
EPITH CASTS URNS QL MICRO: ABNORMAL /LPF
GLUCOSE UR STRIP.AUTO-MCNC: NEGATIVE MG/DL
HGB UR QL STRIP: NEGATIVE
HYALINE CASTS URNS QL MICRO: ABNORMAL /LPF (ref 0–5)
KETONES UR QL STRIP.AUTO: ABNORMAL MG/DL
LEUKOCYTE ESTERASE UR QL STRIP.AUTO: ABNORMAL
NITRITE UR QL STRIP.AUTO: NEGATIVE
PH UR STRIP: 6.5 [PH] (ref 5–8)
PROT UR STRIP-MCNC: NEGATIVE MG/DL
RBC #/AREA URNS HPF: ABNORMAL /HPF (ref 0–5)
SP GR UR REFRACTOMETRY: 1.02 (ref 1–1.03)
UROBILINOGEN UR QL STRIP.AUTO: 1 EU/DL (ref 0.2–1)
WBC URNS QL MICRO: >100 /HPF (ref 0–4)

## 2017-11-26 PROCEDURE — 99285 EMERGENCY DEPT VISIT HI MDM: CPT

## 2017-11-26 PROCEDURE — 94640 AIRWAY INHALATION TREATMENT: CPT

## 2017-11-26 PROCEDURE — 77030029684 HC NEB SM VOL KT MONA -A

## 2017-11-26 PROCEDURE — 81001 URINALYSIS AUTO W/SCOPE: CPT | Performed by: EMERGENCY MEDICINE

## 2017-11-26 PROCEDURE — 74011000250 HC RX REV CODE- 250: Performed by: EMERGENCY MEDICINE

## 2017-11-26 RX ORDER — CEPHALEXIN 500 MG/1
500 CAPSULE ORAL 3 TIMES DAILY
Qty: 21 CAP | Refills: 0 | Status: SHIPPED | OUTPATIENT
Start: 2017-11-26 | End: 2017-12-03

## 2017-11-26 RX ADMIN — ALBUTEROL SULFATE 1 DOSE: 2.5 SOLUTION RESPIRATORY (INHALATION) at 02:26

## 2017-11-26 NOTE — ED NOTES
Discharge paperwork given by provider. Denies questions or concerns. VSS, respirations unlabored, wheeled out of the department in no acute distress.

## 2017-11-26 NOTE — ED NOTES
Patient rang call bell and stated that she forgot to mention urinary frequency and pain x1 day, patient requested to have urinalysis sent.   MD notified, orders received for UA

## 2017-11-26 NOTE — ED TRIAGE NOTES
She had bilateral breast reduction and a \"tummy tuck\" nov 21. She had been doing well until around 11:30 when she was feeling her heart was racing. She believed that was because she had not had any pain medication. She then started to have wheezing. She has asthma but has not had an attack for about a year. She no longer had an inhaler but tried using her incentive spirometer but could not \"clear\" . She has a hx of a PE in 2009. She was not placed on any blood thinners post op. She says she is not short of breath just feels as if she is wheezing. She denies any problems with the surgery, she has two MJ drains exiting from her flank area.

## 2017-11-26 NOTE — ED NOTES
Respiratory at bedside. Patient resting comfortably in stretcher, on monitor x2, VSS, respirations unlabored and in no acute distress.

## 2017-11-26 NOTE — ED PROVIDER NOTES
HPI Comments: 37 y.o. female with past medical history significant for anemia, PE, asthma, cholecystectomy, hysterectomy, hernia repair, and gastric bypass who presents from home with chief complaint of wheezing. The pt c/o asthma like sx such as wheezing and cough for the last hour. The pt's last asthma attack was a year ago. The pt had liposuction, abdominoplasty, and breast reduction 5 days ago by . The pt has been following her surgeon's instructions, using her spirometer, and drinking fluids. The pt tried her Mylanta tonight thinking it could be GERD sx. There are no other acute medical concerns at this time. Social hx: nonsmoker, no EtOH use  PCP: Swathi Bravo MD  Note written by Jaelyn Caruso, as dictated by Alejandrina Correa MD 1:53 AM        The history is provided by the patient. No  was used. Past Medical History:   Diagnosis Date    ADHD (attention deficit hyperactivity disorder)     Anemia     Asthma     HX OF IT  DURING SEASONAL ALLERGIES.  NO FLARE UP \"IN A LONG TIME\"    Insomnia     Migraine     Nausea & vomiting     NAUSEA    Obesity     PE (pulmonary embolism) 2009    POST GASTRIC BY PASS       Past Surgical History:   Procedure Laterality Date    HX BREAST REDUCTION N/A 11/21/2017    BILATERAL BREAST REDUCTION/LIPOSUCTION BACK, FLANK, ABDOMEN/ABDOMINOPLASTY WITH EXPAREL performed by Osiel Sifuentes MD at [address] HX CHOLECYSTECTOMY      HX GASTRIC BYPASS  2009    HX GYN      BILATERAL TUBAL LIGATION     HX HEENT      TONSILLECTOMY    HX HEENT      EXTRACTION OF WISDOM TEETH X 4    HX HERNIA REPAIR      HX HYSTERECTOMY           Family History:   Problem Relation Age of Onset    Diabetes Mother     Hypertension Mother     Alcohol abuse Father        Social History     Social History    Marital status:      Spouse name: N/A    Number of children: N/A    Years of education: N/A     Occupational History    Not on file.     Social History Main Topics    Smoking status: Never Smoker    Smokeless tobacco: Never Used    Alcohol use No    Drug use: No    Sexual activity: Not on file     Other Topics Concern    Not on file     Social History Narrative         ALLERGIES: Aspirin; Darvocet a500 [propoxyphene n-acetaminophen]; Percocet [oxycodone-acetaminophen]; and Percodan [oxycodone hcl-oxycodone-asa]    Review of Systems   Respiratory: Positive for cough and wheezing. All other systems reviewed and are negative. Vitals:    11/26/17 0118   BP: 124/82   Pulse: (!) 104   Resp: 18   Temp: 97.8 °F (36.6 °C)   SpO2: 99%   Weight: 74.4 kg (164 lb)   Height: 5' 1\" (1.549 m)            Physical Exam   Constitutional: She is oriented to person, place, and time. She appears well-developed and well-nourished. No distress. HENT:   Head: Normocephalic and atraumatic. Eyes: Conjunctivae are normal. No scleral icterus. Neck: Neck supple. No tracheal deviation present. Cardiovascular: Regular rhythm, normal heart sounds and intact distal pulses. Exam reveals no gallop and no friction rub. No murmur heard. Mild tachycardia   Pulmonary/Chest: Effort normal and breath sounds normal. She has no wheezes. She has no rales. Abdominal: Soft. She exhibits no distension. There is no tenderness. There is no rebound and no guarding. Musculoskeletal: She exhibits no edema. Neurological: She is alert and oriented to person, place, and time. Skin: Skin is warm and dry. No rash noted. Psychiatric: She has a normal mood and affect. Nursing note and vitals reviewed. Note written by Jaelyn Hamm, as dictated by Cheyenne Beaulieu MD 1:54 AM      [facility]  ED Course       Procedures    Progress Note:  Results, treatment, and follow up plan have been discussed with patient/. Questions were answered.    Cheyenne Beaulieu MD  3:37 AM    A/P: pt feels she has been wheezing prior to arrival with a hx asthma - sounds clear on my exam in no distress; neb in ED; MDI for home; Keflex for UTI.   Bev Chandler MD  3:38 AM

## 2017-11-26 NOTE — DISCHARGE INSTRUCTIONS
Wheezing or Bronchoconstriction: Care Instructions  Your Care Instructions  Wheezing is a whistling noise made during breathing. It occurs when the small airways, or bronchial tubes, that lead to your lungs swell or contract (spasm) and become narrow. This narrowing is called bronchoconstriction. When your airways constrict, it is hard for air to pass through and this makes it hard for you to breathe. Wheezing and bronchoconstriction can be caused by many problems, including:  · An infection such as the flu or a cold. · Allergies such as hay fever. · Diseases such as asthma or chronic obstructive pulmonary disease. · Smoking. Treatment for your wheezing depends on what is causing the problem. Your wheezing may get better without treatment. But you may need to pay attention to things that cause your wheezing and avoid them. Or you may need medicine to help treat the wheezing and to reduce the swelling or to relieve spasms in your lungs. Follow-up care is a key part of your treatment and safety. Be sure to make and go to all appointments, and call your doctor if you are having problems. It is also a good idea to know your test results and keep a list of the medicines you take. How can you care for yourself at home? · Take your medicine exactly as prescribed. Call your doctor if you think you are having a problem with your medicine. You will get more details on the specific medicine your doctor prescribes. · If your doctor prescribed antibiotics, take them as directed. Do not stop taking them just because you feel better. You need to take the full course of antibiotics. · Breathe moist air from a humidifier, hot shower, or sink filled with hot water. This may help ease your symptoms and make it easier for you to breathe. · If you have congestion in your nose and throat, drinking plenty of fluids, especially hot fluids, may help relieve your symptoms.  If you have kidney, heart, or liver disease and have to limit fluids, talk with your doctor before you increase the amount of fluids you drink. · If you have mucus in your airways, it may help to breathe deeply and cough. · Do not smoke or allow others to smoke around you. Smoking can make your wheezing worse. If you need help quitting, talk to your doctor about stop-smoking programs and medicines. These can increase your chances of quitting for good. · Avoid things that may cause your wheezing. These may include colds, smoke, air pollution, dust, pollen, pets, cockroaches, stress, and cold air. When should you call for help? Call 911 anytime you think you may need emergency care. For example, call if:  ? · You have severe trouble breathing. ? · You passed out (lost consciousness). ?Call your doctor now or seek immediate medical care if:  ? · You cough up yellow, dark brown, or bloody mucus (sputum). ? · You have new or worse shortness of breath. ? · Your wheezing is not getting better or it gets worse after you start taking your medicine. ? Watch closely for changes in your health, and be sure to contact your doctor if:  ? · You do not get better as expected. Where can you learn more? Go to http://oralia-kalyani.info/. Enter 454 [id number] in the search box to learn more about \"Wheezing or Bronchoconstriction: Care Instructions. \"  Current as of: May 12, 2017  Content Version: 11.4  © 7725-4237 [employer]. Care instructions adapted under license by [employer] (which disclaims liability or warranty for this information). If you have questions about a medical condition or this instruction, always ask your healthcare professional. [place] [address] any warranty or liability for your use of this information.          Urinary Tract Infection in Women: Care Instructions  Your Care Instructions    A urinary tract infection, or UTI, is a general term for an infection anywhere between the kidneys and the urethra (where urine comes out). Most UTIs are bladder infections. They often cause pain or burning when you urinate. UTIs are caused by bacteria and can be cured with antibiotics. Be sure to complete your treatment so that the infection goes away. Follow-up care is a key part of your treatment and safety. Be sure to make and go to all appointments, and call your doctor if you are having problems. It's also a good idea to know your test results and keep a list of the medicines you take. How can you care for yourself at home? · Take your antibiotics as directed. Do not stop taking them just because you feel better. You need to take the full course of antibiotics. · Drink extra water and other fluids for the next day or two. This may help wash out the bacteria that are causing the infection. (If you have kidney, heart, or liver disease and have to limit fluids, talk with your doctor before you increase your fluid intake.)  · Avoid drinks that are carbonated or have caffeine. They can irritate the bladder. · Urinate often. Try to empty your bladder each time. · To relieve pain, take a hot bath or lay a heating pad set on low over your lower belly or genital area. Never go to sleep with a heating pad in place. To prevent UTIs  · Drink plenty of water each day. This helps you urinate often, which clears bacteria from your system. (If you have kidney, heart, or liver disease and have to limit fluids, talk with your doctor before you increase your fluid intake.)  · Urinate when you need to. · Urinate right after you have sex. · Change sanitary pads often. · Avoid douches, bubble baths, feminine hygiene sprays, and other feminine hygiene products that have deodorants. · After going to the bathroom, wipe from front to back. When should you call for help? Call your doctor now or seek immediate medical care if:  ? · Symptoms such as fever, chills, nausea, or vomiting get worse or appear for the first time. ? · You have new pain in your back just below your rib cage. This is called flank pain. ? · There is new blood or pus in your urine. ? · You have any problems with your antibiotic medicine. ? Watch closely for changes in your health, and be sure to contact your doctor if:  ? · You are not getting better after taking an antibiotic for 2 days. ? · Your symptoms go away but then come back. Where can you learn more? Go to http://oralia-kalyani.info/. Enter X599 in the search box to learn more about \"Urinary Tract Infection in Women: Care Instructions. \"  Current as of: May 12, 2017  Content Version: 11.4  © 1797-2980 [employer]. Care instructions adapted under license by [employer] (which disclaims liability or warranty for this information). If you have questions about a medical condition or this instruction, always ask your healthcare professional. [address] any warranty or liability for your use of this information.

## 2017-11-30 ENCOUNTER — APPOINTMENT (OUTPATIENT)
Dept: CT IMAGING | Age: 42
End: 2017-11-30
Attending: PHYSICIAN ASSISTANT
Payer: COMMERCIAL

## 2017-11-30 ENCOUNTER — HOSPITAL ENCOUNTER (EMERGENCY)
Age: 42
Discharge: HOME | End: 2017-11-30
Attending: STUDENT IN AN ORGANIZED HEALTH CARE EDUCATION/TRAINING PROGRAM
Payer: COMMERCIAL

## 2017-11-30 VITALS
HEIGHT: 61 IN | BODY MASS INDEX: 30.58 KG/M2 | TEMPERATURE: 98 F | WEIGHT: 162 LBS | OXYGEN SATURATION: 100 % | RESPIRATION RATE: 18 BRPM | SYSTOLIC BLOOD PRESSURE: 117 MMHG | HEART RATE: 98 BPM | DIASTOLIC BLOOD PRESSURE: 78 MMHG

## 2017-11-30 DIAGNOSIS — G89.18 POST-OP PAIN: Primary | ICD-10-CM

## 2017-11-30 LAB
BASOPHILS # BLD: 0 K/UL (ref 0–0.1)
BASOPHILS NFR BLD: 0 % (ref 0–1)
DIFFERENTIAL METHOD BLD: ABNORMAL
EOSINOPHIL # BLD: 0.2 K/UL (ref 0–0.4)
EOSINOPHIL NFR BLD: 2 % (ref 0–7)
ERYTHROCYTE [DISTWIDTH] IN BLOOD BY AUTOMATED COUNT: 17.7 % (ref 11.5–14.5)
HCT VFR BLD AUTO: 33.4 % (ref 35–47)
HGB BLD-MCNC: 10.7 G/DL (ref 11.5–16)
LYMPHOCYTES # BLD: 3.1 K/UL (ref 0.8–3.5)
LYMPHOCYTES NFR BLD: 25 % (ref 12–49)
MCH RBC QN AUTO: 27.1 PG (ref 26–34)
MCHC RBC AUTO-ENTMCNC: 32 G/DL (ref 30–36.5)
MCV RBC AUTO: 84.6 FL (ref 80–99)
MONOCYTES # BLD: 0.5 K/UL (ref 0–1)
MONOCYTES NFR BLD: 4 % (ref 5–13)
NEUTS SEG # BLD: 8.4 K/UL (ref 1.8–8)
NEUTS SEG NFR BLD: 69 % (ref 32–75)
PLATELET # BLD AUTO: 437 K/UL (ref 150–400)
RBC # BLD AUTO: 3.95 M/UL (ref 3.8–5.2)
RBC MORPH BLD: ABNORMAL
WBC # BLD AUTO: 12.2 K/UL (ref 3.6–11)

## 2017-11-30 PROCEDURE — 74011250636 HC RX REV CODE- 250/636: Performed by: PHYSICIAN ASSISTANT

## 2017-11-30 PROCEDURE — 96374 THER/PROPH/DIAG INJ IV PUSH: CPT

## 2017-11-30 PROCEDURE — 74176 CT ABD & PELVIS W/O CONTRAST: CPT

## 2017-11-30 PROCEDURE — 96361 HYDRATE IV INFUSION ADD-ON: CPT

## 2017-11-30 PROCEDURE — 85025 COMPLETE CBC W/AUTO DIFF WBC: CPT | Performed by: PHYSICIAN ASSISTANT

## 2017-11-30 PROCEDURE — 36415 COLL VENOUS BLD VENIPUNCTURE: CPT | Performed by: PHYSICIAN ASSISTANT

## 2017-11-30 PROCEDURE — 99283 EMERGENCY DEPT VISIT LOW MDM: CPT

## 2017-11-30 PROCEDURE — 96375 TX/PRO/DX INJ NEW DRUG ADDON: CPT

## 2017-11-30 RX ORDER — DIPHENHYDRAMINE HYDROCHLORIDE 50 MG/ML
25 INJECTION, SOLUTION INTRAMUSCULAR; INTRAVENOUS
Status: COMPLETED | OUTPATIENT
Start: 2017-11-30 | End: 2017-11-30

## 2017-11-30 RX ORDER — ONDANSETRON 2 MG/ML
4 INJECTION INTRAMUSCULAR; INTRAVENOUS
Status: COMPLETED | OUTPATIENT
Start: 2017-11-30 | End: 2017-11-30

## 2017-11-30 RX ORDER — SODIUM CHLORIDE 0.9 % (FLUSH) 0.9 %
10 SYRINGE (ML) INJECTION
Status: DISCONTINUED | OUTPATIENT
Start: 2017-11-30 | End: 2017-11-30 | Stop reason: HOSPADM

## 2017-11-30 RX ORDER — HYDROMORPHONE HYDROCHLORIDE 2 MG/ML
0.5 INJECTION, SOLUTION INTRAMUSCULAR; INTRAVENOUS; SUBCUTANEOUS
Status: COMPLETED | OUTPATIENT
Start: 2017-11-30 | End: 2017-11-30

## 2017-11-30 RX ORDER — MORPHINE SULFATE 2 MG/ML
4 INJECTION, SOLUTION INTRAMUSCULAR; INTRAVENOUS
Status: COMPLETED | OUTPATIENT
Start: 2017-11-30 | End: 2017-11-30

## 2017-11-30 RX ADMIN — ONDANSETRON 4 MG: 2 INJECTION INTRAMUSCULAR; INTRAVENOUS at 17:31

## 2017-11-30 RX ADMIN — PROMETHAZINE HYDROCHLORIDE 25 MG: 25 INJECTION INTRAMUSCULAR; INTRAVENOUS at 19:05

## 2017-11-30 RX ADMIN — MORPHINE SULFATE 4 MG: 2 INJECTION, SOLUTION INTRAMUSCULAR; INTRAVENOUS at 17:31

## 2017-11-30 RX ADMIN — HYDROMORPHONE HYDROCHLORIDE 0.5 MG: 2 INJECTION INTRAMUSCULAR; INTRAVENOUS; SUBCUTANEOUS at 19:05

## 2017-11-30 RX ADMIN — DIPHENHYDRAMINE HYDROCHLORIDE 25 MG: 50 INJECTION, SOLUTION INTRAMUSCULAR; INTRAVENOUS at 19:05

## 2017-11-30 RX ADMIN — SODIUM CHLORIDE 1000 ML: 900 INJECTION, SOLUTION INTRAVENOUS at 17:31

## 2017-11-30 NOTE — ED NOTES
Multiple attempts made by ultrasound to obtain hemolyzed blood work and unsuccessful, Aristeo Grimes made aware

## 2017-11-30 NOTE — ED PROVIDER NOTES
HPI Comments: 40 y.o. female with past medical history significant for anemia, insomnia, ADHD, migraine, PE, asthma, nausea & vomiting, and obesity who presents from home via EMS with chief complaint of abdominal pain. Pt had a tummy tuck on 11/21 and was moving a heavy table today when she felt a sudden sharp pop with pain to her abd incision site. She is now experiencing pain radiating from her pelvis up to her chest with associated nausea. She used ice, Narco, and 10 mg of Valium with no relief of pain. She states that she has 1 staple with derma bond on her incision site. She reports hx of PE and allergies to Percocet and ASA. Pt denies feeling any leakage from the site. Pt denies any vomiting. There are no other acute medical concerns at this time. Social hx: (-) tobacco use; (-) EtOH use  Surgeon: Levester Councilman, MD    Note written by Jaelyn Pepe, as dictated by Farrah Dickey PA-C 4:15 PM    The history is provided by the patient. No  was used. Past Medical History:   Diagnosis Date    ADHD (attention deficit hyperactivity disorder)     Anemia     Asthma     HX OF IT  DURING SEASONAL ALLERGIES.  NO FLARE UP \"IN A LONG TIME\"    Insomnia     Migraine     Nausea & vomiting     NAUSEA    Obesity     PE (pulmonary embolism) 2009    POST GASTRIC BY PASS       Past Surgical History:   Procedure Laterality Date    HX BREAST REDUCTION N/A 11/21/2017    BILATERAL BREAST REDUCTION/LIPOSUCTION BACK, FLANK, ABDOMEN/ABDOMINOPLASTY WITH EXPAREL performed by Levester Councilman, MD at [address] HX CHOLECYSTECTOMY      HX GASTRIC BYPASS  2009    HX GYN      BILATERAL TUBAL LIGATION     HX HEENT      TONSILLECTOMY    HX HEENT      EXTRACTION OF WISDOM TEETH X 4    HX HERNIA REPAIR      HX HYSTERECTOMY           Family History:   Problem Relation Age of Onset    Diabetes Mother     Hypertension Mother     Alcohol abuse Father        Social History     Social History    Marital status:      Spouse name: N/A    Number of children: N/A    Years of education: N/A     Occupational History    Not on file. Social History Main Topics    Smoking status: Never Smoker    Smokeless tobacco: Never Used    Alcohol use No    Drug use: No    Sexual activity: Not on file     Other Topics Concern    Not on file     Social History Narrative         ALLERGIES: Aspirin; Darvocet a500 [propoxyphene n-acetaminophen]; Percocet [oxycodone-acetaminophen]; and Percodan [oxycodone hcl-oxycodone-asa]    Review of Systems   Constitutional: Negative for chills, diaphoresis and fever. HENT: Negative for congestion, postnasal drip, rhinorrhea and sore throat. Eyes: Negative for photophobia, discharge, redness and visual disturbance. Respiratory: Negative for cough, chest tightness, shortness of breath and wheezing. Cardiovascular: Negative for chest pain, palpitations and leg swelling. Gastrointestinal: Positive for abdominal pain and nausea. Negative for abdominal distention, blood in stool, constipation, diarrhea and vomiting. Genitourinary: Negative for difficulty urinating, dysuria, frequency, hematuria and urgency. Musculoskeletal: Negative for arthralgias, back pain, joint swelling and myalgias. Skin: Positive for wound (abd surgical wound). Negative for color change and rash. Neurological: Negative for dizziness, speech difficulty, weakness, light-headedness, numbness and headaches. Psychiatric/Behavioral: Negative for confusion. The patient is not nervous/anxious. All other systems reviewed and are negative. Vitals:    11/30/17 1604   BP: 104/73   Pulse: (!) 103   Resp: 18   Temp: 98.3 °F (36.8 °C)   SpO2: 100%   Weight: 73.5 kg (162 lb)   Height: 5' 1\" (1.549 m)            Physical Exam   Constitutional: She is oriented to person, place, and time. She appears well-developed and well-nourished. No distress.    HENT:   Head: Normocephalic and atraumatic. Right Ear: External ear normal.   Left Ear: External ear normal.   Nose: Nose normal.   Mouth/Throat: Oropharynx is clear and moist.   Eyes: Conjunctivae and EOM are normal. Pupils are equal, round, and reactive to light. No scleral icterus. Neck: Normal range of motion. Neck supple. No JVD present. No tracheal deviation present. No thyromegaly present. Cardiovascular: Normal rate, regular rhythm and normal heart sounds. Exam reveals no gallop and no friction rub. No murmur heard. Pulmonary/Chest: Effort normal and breath sounds normal. No respiratory distress. She has no wheezes. She has no rales. She exhibits no tenderness. Abdominal: Soft. Bowel sounds are normal. She exhibits no distension and no mass. There is tenderness. There is no rebound and no guarding. Pt is tender over abdominal incision site. Musculoskeletal: Normal range of motion. She exhibits no edema or tenderness. Lymphadenopathy:     She has no cervical adenopathy. Neurological: She is alert and oriented to person, place, and time. She has normal strength. She displays no atrophy and no tremor. No cranial nerve deficit. She exhibits normal muscle tone. Coordination and gait normal.   Skin: Skin is dry. No rash noted. She is not diaphoretic. Large incisional scar lower abdominal region. No purulent drainage, warmth, or erythema. Psychiatric: She has a normal mood and affect. Her behavior is normal.   Nursing note and vitals reviewed. MDM  Number of Diagnoses or Management Options  Post-op pain:   Diagnosis management comments: CT of abdomen negative. Pt is afebrile and non toxic appearing. Will treat symptomatically and advise close follow up with her surgeon for further evaluation of symptoms. Will dc home. Reviewed treatment plan with attending and they agree.   Robyn Robertson PA-C    ED Course       Procedures

## 2017-12-01 NOTE — DISCHARGE INSTRUCTIONS
Acute Pain After Surgery: Care Instructions  Your Care Instructions    It's common to have some pain after surgery. Pain doesn't mean that something is wrong or that the surgery didn't go well. But when the pain is severe, it's important to work with your doctor to manage it. It's also important to be aware of a few facts about pain and pain medicine. · You are the only person who knows what your pain feels like. So be sure to tell your doctor when you are in pain or when the pain changes. Then he or she will know how to adjust your medicines. · Pain is often easier to control right after it starts. So it may be better to take regular doses of pain medicine and not wait until the pain gets bad. · Medicine can help control pain. But this doesn't mean you'll have no pain. Medicine works to keep the pain at a level you can live with. With time, you will feel better. Follow-up care is a key part of your treatment and safety. Be sure to make and go to all appointments, and call your doctor if you are having problems. It's also a good idea to know your test results and keep a list of the medicines you take. How can you care for yourself at home? · Be safe with medicines. Read and follow all instructions on the label. ¨ If the doctor gave you a prescription medicine for pain, take it as prescribed. ¨ If you are not taking a prescription pain medicine, ask your doctor if you can take an over-the-counter medicine. · If you take an over-the-counter pain medicine, such as acetaminophen (Tylenol), ibuprofen (Advil, Motrin), or naproxen (Aleve), read and follow all instructions on the label. · Do not take two or more pain medicines at the same time unless the doctor told you to. · Do not drink alcohol while you are taking pain medicines. · Try to walk each day if your doctor recommends it. Start by walking a little more than you did the day before. Bit by bit, increase the amount you walk.  Walking increases blood flow. It also helps prevent pneumonia and constipation. · To prevent constipation from opioid pain medicines:  ¨ Talk to your doctor about a laxative. ¨ Include fruits, vegetables, beans, and whole grains in your diet each day. These foods are high in fiber. ¨ Drink plenty of fluids, enough so that your urine is light yellow or clear like water. Drink water, fruit juice, or other drinks that do not contain caffeine or alcohol. If you have kidney, heart, or liver disease and have to limit fluids, talk with your doctor before you increase the amount of fluids you drink. ¨ Take a fiber supplement, such as Citrucel or Metamucil, every day if needed. Read and follow all instructions on the label. If you take pain medicine for more than a few days, talk to your doctor before you take fiber. When should you call for help? Call your doctor now or seek immediate medical care if:  ? · Your pain gets worse. ? · Your pain is not controlled by medicine. ? Watch closely for changes in your health, and be sure to contact your doctor if you have any problems. Where can you learn more? Go to http://oralia-kalyani.info/. Enter [phone] in the search box to learn more about \"Acute Pain After Surgery: Care Instructions. \"  Current as of: March 20, 2017  Content Version: 11.4  © 7925-8576 [employer]. Care instructions adapted under license by [employer] (which disclaims liability or warranty for this information). If you have questions about a medical condition or this instruction, always ask your healthcare professional. [place] [address] any warranty or liability for your use of this information. We hope that we have addressed all of your medical concerns. The examination and treatment you received in the Emergency Department were for an emergent problem and were not intended as complete care.  It is important that you follow up with your healthcare provider(s) for ongoing care. If your symptoms worsen or do not improve as expected, and you are unable to reach your usual health care provider(s), you should return to the Emergency Department. Today's healthcare is undergoing tremendous change, and patient satisfaction surveys are one of the many tools to assess the quality of medical care. You may receive a survey from the [employer] regarding your experience in the Emergency Department. I hope that your experience has been completely positive, particularly the medical care that I provided. As such, please participate in the survey; anything less than excellent does not meet my expectations or intentions. [facility] and [facility] participate in nationally recognized quality of care measures. If your blood pressure is greater than 120/80, as reported below, we urge that you seek medical care to address the potential of high blood pressure, commonly known as hypertension. Hypertension can be hereditary or can be caused by certain medical conditions, pain, stress, or \"white coat syndrome. \"       Please make an appointment with your health care provider(s) for follow up of your Emergency Department visit. VITALS:   Patient Vitals for the past 8 hrs:   Temp Pulse Resp BP SpO2   11/30/17 1604 98.3 °F (36.8 °C) (!) 103 18 104/73 100 %          Thank you for allowing us to provide you with medical care today. We realize that you have many choices for your emergency care needs. Please choose us in the future for any continued health care needs. Bharat Stone, [address] [facility]: [phone]            Recent Results (from the past 24 hour(s))   CBC WITH AUTOMATED DIFF    Collection Time: 11/30/17  4:59 PM   Result Value Ref Range    WBC 12.2 (H) 3.6 - 11.0 K/uL    RBC 3.95 3.80 - 5.20 M/uL    HGB 10.7 (L) 11.5 - 16.0 g/dL    HCT 33.4 (L) 35.0 - 47.0 %    MCV 84.6 80.0 - 99.0 FL    MCH 27.1 26.0 - 34.0 PG    MCHC 32.0 30.0 - 36.5 g/dL    RDW 17.7 (H) 11.5 - 14.5 %    PLATELET 610 (H) 658 - 400 K/uL    NEUTROPHILS 69 32 - 75 %    LYMPHOCYTES 25 12 - 49 %    MONOCYTES 4 (L) 5 - 13 %    EOSINOPHILS 2 0 - 7 %    BASOPHILS 0 0 - 1 %    ABS. NEUTROPHILS 8.4 (H) 1.8 - 8.0 K/UL    ABS. LYMPHOCYTES 3.1 0.8 - 3.5 K/UL    ABS. MONOCYTES 0.5 0.0 - 1.0 K/UL    ABS. EOSINOPHILS 0.2 0.0 - 0.4 K/UL    ABS. BASOPHILS 0.0 0.0 - 0.1 K/UL    DF SMEAR SCANNED      RBC COMMENTS ANISOCYTOSIS  1+        RBC COMMENTS POLYCHROMASIA  PRESENT        RBC COMMENTS MICROCYTOSIS  PRESENT           Ct Abd Pelv Wo Cont    Result Date: 11/30/2017  EXAM:  CT ABD PELV WO CONT INDICATION:  ABDOMINAL PAIN; POST OP.abdominal plasty procedure 10 days ago. Now with pain. COMPARISON: None. CONTRAST:  None. TECHNIQUE: Unenhanced multislice helical CT was performed from the diaphragm to the symphysis pubis without oral or intravenous contrast administration. Contiguous 5 mm axial images were reconstructed and lung and soft tissue windows were generated. Coronal and sagittal reformations were generated. CT dose reduction was achieved through use of a standardized protocol tailored for this examination and automatic exposure control for dose modulation. FINDINGS: LUNG: The visualized portions of the lung bases are clear. The absence of intravenous contrast material reduces the sensitivity for evaluation of the solid parenchymal organs of the abdomen. LIVER: No focal lesion. GALLBLADDER: Absent SPLEEN: No focal lesion. PANCREAS: No focal lesion. ADRENALS: No focal lesion. KIDNEYS: No calculus. No hydronephrosis. GI: There is no evidence of bowel obstruction. Previous gastric bypass surgery noted. . APPENDIX: Unremarkable. AORTA: The aorta tapers without aneurysm. RETROPERITONEUM:  There is no retroperitoneal adenopathy or mass. PERITONEUM: There is no ascites or free intraperitoneal air. BLADDER: Unremarkable PELVIS: Previous hysterectomy noted. Levar Reasoner BONES: No sclerotic or lytic lesions noted. Abdominal wall: There is a surgical drain in the subcutaneous soft tissues of the inferior abdominal wall. Although there is edema in the subcutaneous fat, there is no significant abnormal collection identified. There is no evidence of abdominal wall hernia other than a small amount of umbilical hernia fat. There is a tiny bubble of gas in the subcutaneous soft tissues on the right (3-43) as expected postoperatively     IMPRESSION:  No abnormal collection is identified in the abdominal wall. Postoperative findings as described above. Remainder of the study is unremarkable.

## 2018-09-23 ENCOUNTER — HOSPITAL ENCOUNTER (EMERGENCY)
Age: 43
Discharge: HOME | End: 2018-09-23
Attending: EMERGENCY MEDICINE
Payer: COMMERCIAL

## 2018-09-23 ENCOUNTER — APPOINTMENT (OUTPATIENT)
Dept: CT IMAGING | Age: 43
End: 2018-09-23
Attending: EMERGENCY MEDICINE
Payer: COMMERCIAL

## 2018-09-23 VITALS
DIASTOLIC BLOOD PRESSURE: 68 MMHG | OXYGEN SATURATION: 100 % | TEMPERATURE: 98.3 F | HEIGHT: 61 IN | RESPIRATION RATE: 22 BRPM | SYSTOLIC BLOOD PRESSURE: 115 MMHG | HEART RATE: 82 BPM | BODY MASS INDEX: 30.78 KG/M2 | WEIGHT: 163 LBS

## 2018-09-23 DIAGNOSIS — R07.89 ATYPICAL CHEST PAIN: Primary | ICD-10-CM

## 2018-09-23 DIAGNOSIS — R91.1 PULMONARY NODULE: ICD-10-CM

## 2018-09-23 DIAGNOSIS — E04.1 THYROID NODULE: ICD-10-CM

## 2018-09-23 LAB
ALBUMIN SERPL-MCNC: 3.4 G/DL (ref 3.5–5)
ALBUMIN/GLOB SERPL: 0.8 {RATIO} (ref 1.1–2.2)
ALP SERPL-CCNC: 122 U/L (ref 45–117)
ALT SERPL-CCNC: 15 U/L (ref 12–78)
ANION GAP SERPL CALC-SCNC: 7 MMOL/L (ref 5–15)
AST SERPL-CCNC: 19 U/L (ref 15–37)
BASOPHILS # BLD: 0 K/UL (ref 0–0.1)
BASOPHILS NFR BLD: 0 % (ref 0–1)
BILIRUB SERPL-MCNC: 0.2 MG/DL (ref 0.2–1)
BUN SERPL-MCNC: 7 MG/DL (ref 6–20)
BUN/CREAT SERPL: 9 (ref 12–20)
CALCIUM SERPL-MCNC: 8.5 MG/DL (ref 8.5–10.1)
CHLORIDE SERPL-SCNC: 105 MMOL/L (ref 97–108)
CO2 SERPL-SCNC: 26 MMOL/L (ref 21–32)
COMMENT, HOLDF: NORMAL
CREAT SERPL-MCNC: 0.8 MG/DL (ref 0.55–1.02)
D DIMER PPP FEU-MCNC: 1.57 MG/L FEU (ref 0–0.65)
DIFFERENTIAL METHOD BLD: ABNORMAL
EOSINOPHIL # BLD: 0.1 K/UL (ref 0–0.4)
EOSINOPHIL NFR BLD: 1 % (ref 0–7)
ERYTHROCYTE [DISTWIDTH] IN BLOOD BY AUTOMATED COUNT: 20.2 % (ref 11.5–14.5)
GLOBULIN SER CALC-MCNC: 4.2 G/DL (ref 2–4)
GLUCOSE SERPL-MCNC: 105 MG/DL (ref 65–100)
HCT VFR BLD AUTO: 35.2 % (ref 35–47)
HGB BLD-MCNC: 10.9 G/DL (ref 11.5–16)
LYMPHOCYTES # BLD: 2.4 K/UL (ref 0.8–3.5)
LYMPHOCYTES NFR BLD: 42 % (ref 12–49)
MCH RBC QN AUTO: 21.2 PG (ref 26–34)
MCHC RBC AUTO-ENTMCNC: 31 G/DL (ref 30–36.5)
MCV RBC AUTO: 68.6 FL (ref 80–99)
MONOCYTES # BLD: 0.5 K/UL (ref 0–1)
MONOCYTES NFR BLD: 8 % (ref 5–13)
NEUTS SEG # BLD: 2.7 K/UL (ref 1.8–8)
NEUTS SEG NFR BLD: 49 % (ref 32–75)
PLATELET # BLD AUTO: 253 K/UL (ref 150–400)
PLATELET COMMENTS,PCOM: ABNORMAL
PMV BLD AUTO: 11 FL (ref 8.9–12.9)
POTASSIUM SERPL-SCNC: 3.7 MMOL/L (ref 3.5–5.1)
PROT SERPL-MCNC: 7.6 G/DL (ref 6.4–8.2)
RBC # BLD AUTO: 5.13 M/UL (ref 3.8–5.2)
RBC MORPH BLD: ABNORMAL
SAMPLES BEING HELD,HOLD: NORMAL
SODIUM SERPL-SCNC: 138 MMOL/L (ref 136–145)
TROPONIN I SERPL-MCNC: <0.05 NG/ML
WBC # BLD AUTO: 5.7 K/UL (ref 3.6–11)
XXWBCSUS: 0

## 2018-09-23 PROCEDURE — 74011250637 HC RX REV CODE- 250/637: Performed by: EMERGENCY MEDICINE

## 2018-09-23 PROCEDURE — 36415 COLL VENOUS BLD VENIPUNCTURE: CPT | Performed by: EMERGENCY MEDICINE

## 2018-09-23 PROCEDURE — 71275 CT ANGIOGRAPHY CHEST: CPT

## 2018-09-23 PROCEDURE — 74011000250 HC RX REV CODE- 250: Performed by: EMERGENCY MEDICINE

## 2018-09-23 PROCEDURE — 93005 ELECTROCARDIOGRAM TRACING: CPT

## 2018-09-23 PROCEDURE — 85025 COMPLETE CBC W/AUTO DIFF WBC: CPT | Performed by: EMERGENCY MEDICINE

## 2018-09-23 PROCEDURE — 84484 ASSAY OF TROPONIN QUANT: CPT | Performed by: EMERGENCY MEDICINE

## 2018-09-23 PROCEDURE — 85379 FIBRIN DEGRADATION QUANT: CPT | Performed by: EMERGENCY MEDICINE

## 2018-09-23 PROCEDURE — 99285 EMERGENCY DEPT VISIT HI MDM: CPT

## 2018-09-23 PROCEDURE — 74011250636 HC RX REV CODE- 250/636: Performed by: EMERGENCY MEDICINE

## 2018-09-23 PROCEDURE — 74011636320 HC RX REV CODE- 636/320: Performed by: EMERGENCY MEDICINE

## 2018-09-23 PROCEDURE — 96374 THER/PROPH/DIAG INJ IV PUSH: CPT

## 2018-09-23 PROCEDURE — 80053 COMPREHEN METABOLIC PANEL: CPT | Performed by: EMERGENCY MEDICINE

## 2018-09-23 RX ORDER — LORAZEPAM 2 MG/ML
0.5 INJECTION INTRAMUSCULAR
Status: COMPLETED | OUTPATIENT
Start: 2018-09-23 | End: 2018-09-23

## 2018-09-23 RX ORDER — GUAIFENESIN 1200 MG
325 TABLET, EXTENDED RELEASE 12 HR ORAL AS NEEDED
COMMUNITY

## 2018-09-23 RX ORDER — IBUPROFEN 200 MG
600 TABLET ORAL AS NEEDED
COMMUNITY

## 2018-09-23 RX ADMIN — LORAZEPAM 0.5 MG: 2 INJECTION INTRAMUSCULAR; INTRAVENOUS at 22:17

## 2018-09-23 RX ADMIN — LIDOCAINE HYDROCHLORIDE 40 ML: 20 SOLUTION ORAL; TOPICAL at 21:44

## 2018-09-23 RX ADMIN — IOPAMIDOL 100 ML: 755 INJECTION, SOLUTION INTRAVENOUS at 22:51

## 2018-09-24 LAB
ATRIAL RATE: 84 BPM
CALCULATED P AXIS, ECG09: 28 DEGREES
CALCULATED R AXIS, ECG10: 14 DEGREES
CALCULATED T AXIS, ECG11: 27 DEGREES
DIAGNOSIS, 93000: NORMAL
P-R INTERVAL, ECG05: 174 MS
Q-T INTERVAL, ECG07: 382 MS
QRS DURATION, ECG06: 70 MS
QTC CALCULATION (BEZET), ECG08: 451 MS
VENTRICULAR RATE, ECG03: 84 BPM

## 2018-09-24 NOTE — DISCHARGE INSTRUCTIONS
Chest Pain: Care Instructions  Your Care Instructions    There are many things that can cause chest pain. Some are not serious and will get better on their own in a few days. But some kinds of chest pain need more testing and treatment. Your doctor may have recommended a follow-up visit in the next 8 to 12 hours. If you are not getting better, you may need more tests or treatment. Even though your doctor has released you, you still need to watch for any problems. The doctor carefully checked you, but sometimes problems can develop later. If you have new symptoms or if your symptoms do not get better, get medical care right away. If you have worse or different chest pain or pressure that lasts more than 5 minutes or you passed out (lost consciousness), call 911 or seek other emergency help right away. A medical visit is only one step in your treatment. Even if you feel better, you still need to do what your doctor recommends, such as going to all suggested follow-up appointments and taking medicines exactly as directed. This will help you recover and help prevent future problems. How can you care for yourself at home? · Rest until you feel better. · Take your medicine exactly as prescribed. Call your doctor if you think you are having a problem with your medicine. · Do not drive after taking a prescription pain medicine. When should you call for help? Call 911 if:    · You passed out (lost consciousness).     · You have severe difficulty breathing.     · You have symptoms of a heart attack. These may include:  ¨ Chest pain or pressure, or a strange feeling in your chest.  ¨ Sweating. ¨ Shortness of breath. ¨ Nausea or vomiting. ¨ Pain, pressure, or a strange feeling in your back, neck, jaw, or upper belly or in one or both shoulders or arms. ¨ Lightheadedness or sudden weakness. ¨ A fast or irregular heartbeat.   After you call 911, the  may tell you to chew 1 adult-strength or 2 to 4 low-dose aspirin. Wait for an ambulance. Do not try to drive yourself.    Call your doctor today if:    · You have any trouble breathing.     · Your chest pain gets worse.     · You are dizzy or lightheaded, or you feel like you may faint.     · You are not getting better as expected.     · You are having new or different chest pain. Where can you learn more? Go to http://oralia-kalyani.info/. Enter A120 in the search box to learn more about \"Chest Pain: Care Instructions. \"  Current as of: November 20, 2017  Content Version: 11.7  © 6011-2822 [employer]. Care instructions adapted under license by [employer] (which disclaims liability or warranty for this information). If you have questions about a medical condition or this instruction, always ask your healthcare professional. [address] any warranty or liability for your use of this information. Learning About Lung Nodules  What is a lung nodule? A lung nodule is a growth in the lung. A single nodule surrounded by lung tissue is called a solitary pulmonary nodule. A lung nodule might not cause any symptoms. Your doctor may have found one or more nodules on your lung when you were having a chest X-ray or CT scan. Or it may have been found during a lung cancer screening. A lung nodule may be caused by an old infection or cancer. It might also be a noncancerous growth. Lung nodules can cause a screening to give an abnormal result. Most nodules do not cause any harm. But without further tests, your doctor can't tell whether an abnormal finding is cancer, a harmless nodule, or something else. What can you expect when you have a lung nodule? Your doctor will look at several risk factors to see how likely it is that the nodule is cancer. He or she will look at:  · Whether you smoke or have ever smoked. · Your age and your family's medical history.   · Whether you have ever had lung cancer. · The size and shape of the nodule. · Whether the nodule has changed in size. Your doctor may look at past chest X-rays or CT scans, if available, and compare them. Or you may have a series of CT scans to see if the nodule grows over time. What happens next depends on the risk of the nodule being cancer. · If you have no risk factors and the nodule is small, your doctor may advise doing nothing. · If the risk is small, your doctor may schedule follow-up appointments and tests. You may have more CT scans later to see if the nodule is growing. If the nodule hasn't changed in 3 to 6 months, you may have CT scans every year. If it hasn't changed in 2 years, you may not need any more tests. · If there's a higher risk of cancer, your doctor may:  Sulema Maldonado a PET scan, which may help tell if the nodule is cancerous or not. ¨ Take a sample of tissue from the nodule for testing. This is called a biopsy. ¨ Remove the nodule with surgery. Follow-up care is a key part of your treatment and safety. Be sure to make and go to all appointments, and call your doctor if you are having problems. It's also a good idea to know your test results and keep a list of the medicines you take. Where can you learn more? Go to http://oralia-kalyani.info/. Enter B065 in the search box to learn more about \"Learning About Lung Nodules. \"  Current as of: May 12, 2017  Content Version: 11.7  © 5601-1470 [employer]. Care instructions adapted under license by [employer] (which disclaims liability or warranty for this information). If you have questions about a medical condition or this instruction, always ask your healthcare professional. [place] [address] any warranty or liability for your use of this information. Thyroid Nodules: Care Instructions  Your Care Instructions  Thyroid nodules are growths or lumps in the thyroid gland.  Your thyroid is in the front of your neck. It controls how your body uses energy. You may have tests to see if the nodule is caused by cancer. Most nodules aren't cancer and don't cause problems. Many don't even need treatment. If you do have cancer, it can usually be cured. Treatment will probably include surgery. You may also get radioactive iodine treatment. If your thyroid can't make thyroid hormone after treatment, you can take a pill every day to replace the hormone. Follow-up care is a key part of your treatment and safety. Be sure to make and go to all appointments, and call your doctor if you are having problems. It's also a good idea to know your test results and keep a list of the medicines you take. How can you care for yourself at home? · Be safe with medicines. If you take thyroid hormone medicine:  ¨ Take it exactly as prescribed. Call your doctor if you think you are having a problem with your medicine. If you take the right amount and don't skip doses, you probably won't have side effects. ¨ Do not take it with calcium, vitamins, or iron. ¨ Try not to miss a dose. ¨ Do not take extra doses. This will not help you get better any faster. It may also cause side effects. ¨ Tell your doctor about any medicines you take. This includes over-the-counter medicines. ¨ Wear a medical alert bracelet or necklace that says you take thyroid hormones. You can buy these at most drugstores. When should you call for help? Call 911 anytime you think you may need emergency care.  For example, call if:    · You lose consciousness.    Call your doctor now or seek immediate medical care if:    · You have shortness of breath.    Watch closely for changes in your health, and be sure to contact your doctor if:    · You have pain in your neck, jaw, or ear.     · You have problems swallowing.     · You feel weak and tired.     · You have nervousness, a fast heartbeat, hand tremors, problems sleeping, increased sweating, and weight loss.     · You do not feel better even though you are taking your medicine. Where can you learn more? Go to http://oralia-kalyani.info/. Enter M219 in the search box to learn more about \"Thyroid Nodules: Care Instructions. \"  Current as of: June 22, 2017  Content Version: 11.7  © 4257-3053 [employer]. Care instructions adapted under license by [employer] (which disclaims liability or warranty for this information). If you have questions about a medical condition or this instruction, always ask your healthcare professional. [address] any warranty or liability for your use of this information.

## 2018-09-24 NOTE — ED TRIAGE NOTES
Patient states that she was resting when she started having shortness of breath and palpitations. Patient states she used her inhaler and that helped for a bit but then she was short of breath again. Patient denies n/v, diaphoresis as well as numbness or tingling in her extremities. Patient states that she has had something similar to this recently and was told that her potassium was low and she was treated with PO potassium and nebulizer treatments.

## 2018-09-24 NOTE — ED PROVIDER NOTES
HPI Comments: Alex Tinsley is a 41 yo F with history of asthma and PE who has had left upper chest pressure and shortness of breath and palpitations. She states that her symptoms started about 1 hour ago. She used her inhaler which helped briefly but then she felt short of breath again. She states she had a similar episode a coupld of weeks ago and [place] and was told that her potassium was low. She was given potassium in the ED and felt better. Past Medical History:  
Diagnosis Date  ADHD (attention deficit hyperactivity disorder)  Anemia  Asthma HX OF IT  DURING SEASONAL ALLERGIES. NO FLARE UP \"IN A LONG TIME\"  Insomnia  Migraine  Nausea & vomiting NAUSEA  Obesity  PE (pulmonary embolism) 2009 POST GASTRIC BY PASS Past Surgical History:  
Procedure Laterality Date  HX BREAST REDUCTION N/A 11/21/2017 BILATERAL BREAST REDUCTION/LIPOSUCTION BACK, FLANK, ABDOMEN/ABDOMINOPLASTY WITH EXPAREL performed by Mariam Lizama MD at [address] [place] [address]  HX GASTRIC BYPASS  2009  HX GYN    
 BILATERAL TUBAL LIGATION   
 HX HEENT    
 TONSILLECTOMY  HX HEENT    
 EXTRACTION OF WISDOM TEETH X 4  
 HX HERNIA REPAIR    
 HX HYSTERECTOMY Family History:  
Problem Relation Age of Onset  Diabetes Mother  Hypertension Mother  Alcohol abuse Father Social History Social History  Marital status:  Spouse name: N/A  
 Number of children: N/A  
 Years of education: N/A Occupational History  Not on file. Social History Main Topics  Smoking status: Never Smoker  Smokeless tobacco: Never Used  Alcohol use No  
 Drug use: No  
 Sexual activity: Not on file Other Topics Concern  Not on file Social History Narrative ALLERGIES: Aspirin; Darvocet a500 [propoxyphene n-acetaminophen]; Percocet [oxycodone-acetaminophen];  Percodan [oxycodone hcl-oxycodone-asa]; and Fentanyl Review of Systems Constitutional: Negative for fever. HENT: Negative for sore throat. Eyes: Negative for visual disturbance. Respiratory: Positive for chest tightness and shortness of breath. Negative for cough. Cardiovascular: Positive for chest pain and palpitations. Gastrointestinal: Negative for abdominal pain. Genitourinary: Negative for dysuria. Musculoskeletal: Negative for back pain. Skin: Negative for rash. Neurological: Negative for headaches. Vitals:  
 09/23/18 2052 BP: 125/89 Pulse: 89 Resp: 20 Temp: 98.3 °F (36.8 °C) SpO2: 100% Weight: 73.9 kg (163 lb) Height: 5' 1\" (1.549 m) Physical Exam  
Constitutional: She appears well-developed and well-nourished. No distress. HENT:  
Head: Normocephalic and atraumatic. Mouth/Throat: Oropharynx is clear and moist.  
Eyes: Conjunctivae and EOM are normal.  
Neck: Normal range of motion and phonation normal.  
Cardiovascular: Normal rate and intact distal pulses. Pulmonary/Chest: Effort normal and breath sounds normal. No respiratory distress. She has no wheezes. She has no rales. Abdominal: She exhibits no distension. Musculoskeletal: Normal range of motion. She exhibits no tenderness. Neurological: She is alert. She is not disoriented. She exhibits normal muscle tone. Skin: Skin is warm and dry. Nursing note and vitals reviewed. [facility] 
 
 
ED Course 10:11 PM 
Patient reassessed and states that she is feeling more anxious. Requesting medication for her anxiety. Labs reviewed and CMP normal.  Ddimer elevated. CTA chest ordered as well as 0.5mg ativan IV. Procedures

## 2018-09-24 NOTE — ED NOTES
Patient's  came to nurse's station stating patient was feeling anxious and short of breath. Patient sitting up on stretcher rocking back and forth. Patient O2 saturation 100% and . Patient advised to take deep breaths and ativan was ordered for patient.

## 2019-03-15 NOTE — ED TRIAGE NOTES
Patient comes to the ER via EMS c/o abdominal pain after moving a table. Patient had a tummy tuck 11/21. 10-10 pain. no

## 2020-03-16 ENCOUNTER — HOSPITAL ENCOUNTER (EMERGENCY)
Age: 45
Discharge: HOME | End: 2020-03-17
Attending: EMERGENCY MEDICINE
Payer: COMMERCIAL

## 2020-03-16 DIAGNOSIS — R42 DIZZINESS: Primary | ICD-10-CM

## 2020-03-16 DIAGNOSIS — R00.2 PALPITATIONS: ICD-10-CM

## 2020-03-16 PROCEDURE — 93005 ELECTROCARDIOGRAM TRACING: CPT

## 2020-03-16 PROCEDURE — 99284 EMERGENCY DEPT VISIT MOD MDM: CPT

## 2020-03-16 RX ORDER — MECLIZINE HCL 12.5 MG 12.5 MG/1
25 TABLET ORAL
Status: COMPLETED | OUTPATIENT
Start: 2020-03-16 | End: 2020-03-17

## 2020-03-16 NOTE — LETTER
[address] 
[address] [facility] [phone] Work/School Note Date: 3/16/2020 To Whom It May concern: 
 
Fabrizio Torres was seen and treated today in the emergency room by the following provider(s): 
Attending Provider: Radha Acuña MD. Fabrizio Torres may return to work on Wednesday March 18, 2020.  
 
Sincerely, 
 
 
 
 
Nereida Giordano MD

## 2020-03-17 ENCOUNTER — APPOINTMENT (OUTPATIENT)
Dept: CT IMAGING | Age: 45
End: 2020-03-17
Attending: EMERGENCY MEDICINE
Payer: COMMERCIAL

## 2020-03-17 VITALS
TEMPERATURE: 98 F | RESPIRATION RATE: 16 BRPM | SYSTOLIC BLOOD PRESSURE: 120 MMHG | HEART RATE: 79 BPM | OXYGEN SATURATION: 100 % | DIASTOLIC BLOOD PRESSURE: 78 MMHG

## 2020-03-17 LAB
ALBUMIN SERPL-MCNC: 3.6 G/DL (ref 3.5–5)
ALBUMIN/GLOB SERPL: 0.8 {RATIO} (ref 1.1–2.2)
ALP SERPL-CCNC: 178 U/L (ref 45–117)
ALT SERPL-CCNC: 21 U/L (ref 12–78)
ANION GAP SERPL CALC-SCNC: 7 MMOL/L (ref 5–15)
AST SERPL-CCNC: 20 U/L (ref 15–37)
ATRIAL RATE: 99 BPM
BASOPHILS # BLD: 0 K/UL (ref 0–0.1)
BASOPHILS NFR BLD: 0 % (ref 0–1)
BILIRUB SERPL-MCNC: 0.3 MG/DL (ref 0.2–1)
BNP SERPL-MCNC: 18 PG/ML
BUN SERPL-MCNC: 8 MG/DL (ref 6–20)
BUN/CREAT SERPL: 10 (ref 12–20)
CALCIUM SERPL-MCNC: 8.7 MG/DL (ref 8.5–10.1)
CALCULATED P AXIS, ECG09: 39 DEGREES
CALCULATED R AXIS, ECG10: 24 DEGREES
CALCULATED T AXIS, ECG11: 22 DEGREES
CHLORIDE SERPL-SCNC: 105 MMOL/L (ref 97–108)
CO2 SERPL-SCNC: 26 MMOL/L (ref 21–32)
COMMENT, HOLDF: NORMAL
CREAT SERPL-MCNC: 0.78 MG/DL (ref 0.55–1.02)
D DIMER PPP FEU-MCNC: 1.66 MG/L FEU (ref 0–0.65)
DIAGNOSIS, 93000: NORMAL
DIFFERENTIAL METHOD BLD: ABNORMAL
EOSINOPHIL # BLD: 0 K/UL (ref 0–0.4)
EOSINOPHIL NFR BLD: 0 % (ref 0–7)
ERYTHROCYTE [DISTWIDTH] IN BLOOD BY AUTOMATED COUNT: 20 % (ref 11.5–14.5)
GLOBULIN SER CALC-MCNC: 4.6 G/DL (ref 2–4)
GLUCOSE SERPL-MCNC: 105 MG/DL (ref 65–100)
HCT VFR BLD AUTO: 34.8 % (ref 35–47)
HGB BLD-MCNC: 9.7 G/DL (ref 11.5–16)
IMM GRANULOCYTES # BLD AUTO: 0 K/UL (ref 0–0.04)
IMM GRANULOCYTES NFR BLD AUTO: 0 % (ref 0–0.5)
LYMPHOCYTES # BLD: 2 K/UL (ref 0.8–3.5)
LYMPHOCYTES NFR BLD: 26 % (ref 12–49)
MCH RBC QN AUTO: 18.8 PG (ref 26–34)
MCHC RBC AUTO-ENTMCNC: 27.9 G/DL (ref 30–36.5)
MCV RBC AUTO: 67.3 FL (ref 80–99)
MONOCYTES # BLD: 0.3 K/UL (ref 0–1)
MONOCYTES NFR BLD: 4 % (ref 5–13)
NEUTS SEG # BLD: 5.2 K/UL (ref 1.8–8)
NEUTS SEG NFR BLD: 70 % (ref 32–75)
NRBC # BLD: 0 K/UL (ref 0–0.01)
NRBC BLD-RTO: 0 PER 100 WBC
P-R INTERVAL, ECG05: 170 MS
PLATELET # BLD AUTO: 329 K/UL (ref 150–400)
PMV BLD AUTO: 10.7 FL (ref 8.9–12.9)
POTASSIUM SERPL-SCNC: 3.6 MMOL/L (ref 3.5–5.1)
PROT SERPL-MCNC: 8.2 G/DL (ref 6.4–8.2)
Q-T INTERVAL, ECG07: 362 MS
QRS DURATION, ECG06: 70 MS
QTC CALCULATION (BEZET), ECG08: 464 MS
RBC # BLD AUTO: 5.17 M/UL (ref 3.8–5.2)
RBC MORPH BLD: ABNORMAL
SAMPLES BEING HELD,HOLD: NORMAL
SODIUM SERPL-SCNC: 138 MMOL/L (ref 136–145)
TROPONIN I SERPL-MCNC: <0.05 NG/ML
VENTRICULAR RATE, ECG03: 99 BPM
WBC # BLD AUTO: 7.5 K/UL (ref 3.6–11)

## 2020-03-17 PROCEDURE — 71275 CT ANGIOGRAPHY CHEST: CPT

## 2020-03-17 PROCEDURE — 80053 COMPREHEN METABOLIC PANEL: CPT

## 2020-03-17 PROCEDURE — 84484 ASSAY OF TROPONIN QUANT: CPT

## 2020-03-17 PROCEDURE — 74011250636 HC RX REV CODE- 250/636: Performed by: EMERGENCY MEDICINE

## 2020-03-17 PROCEDURE — 36415 COLL VENOUS BLD VENIPUNCTURE: CPT

## 2020-03-17 PROCEDURE — 83880 ASSAY OF NATRIURETIC PEPTIDE: CPT

## 2020-03-17 PROCEDURE — 85379 FIBRIN DEGRADATION QUANT: CPT

## 2020-03-17 PROCEDURE — 74011000258 HC RX REV CODE- 258: Performed by: RADIOLOGY

## 2020-03-17 PROCEDURE — 85025 COMPLETE CBC W/AUTO DIFF WBC: CPT

## 2020-03-17 PROCEDURE — 96360 HYDRATION IV INFUSION INIT: CPT

## 2020-03-17 PROCEDURE — 74011636320 HC RX REV CODE- 636/320: Performed by: RADIOLOGY

## 2020-03-17 RX ORDER — SODIUM CHLORIDE 0.9 % (FLUSH) 0.9 %
10 SYRINGE (ML) INJECTION
Status: COMPLETED | OUTPATIENT
Start: 2020-03-17 | End: 2020-03-17

## 2020-03-17 RX ORDER — MECLIZINE HYDROCHLORIDE 25 MG/1
25 TABLET ORAL
Qty: 15 TAB | Refills: 0 | Status: SHIPPED | OUTPATIENT
Start: 2020-03-17 | End: 2020-03-27

## 2020-03-17 RX ADMIN — IOPAMIDOL 75 ML: 755 INJECTION, SOLUTION INTRAVENOUS at 00:54

## 2020-03-17 RX ADMIN — SODIUM CHLORIDE 1000 ML: 900 INJECTION, SOLUTION INTRAVENOUS at 00:18

## 2020-03-17 RX ADMIN — MECLIZINE 25 MG: 12.5 TABLET ORAL at 00:18

## 2020-03-17 RX ADMIN — Medication 10 ML: at 00:54

## 2020-03-17 RX ADMIN — SODIUM CHLORIDE 100 ML: 900 INJECTION, SOLUTION INTRAVENOUS at 00:54

## 2020-03-17 NOTE — DISCHARGE INSTRUCTIONS
Patient Education      Work up in the emergency room was reassuring tonight- EKG, cat scan of your chest and blood work. I suspect your symptoms are more related to vertigo which is an inner ear issue. I have prescribed Meclizine to see if that doesn't help your symptoms. Please follow up with your primary care doctor within 1 week if your symptoms are not continuing to improve. If you feel your condition/symptoms are getting worse- return to the ED. Dizziness: Care Instructions  Your Care Instructions  Dizziness is the feeling of unsteadiness or fuzziness in your head. It is different than having vertigo, which is a feeling that the room is spinning or that you are moving or falling. It is also different from lightheadedness, which is the feeling that you are about to faint. It can be hard to know what causes dizziness. Some people feel dizzy when they have migraine headaches. Sometimes bouts of flu can make you feel dizzy. Some medical conditions, such as heart problems or high blood pressure, can make you feel dizzy. Many medicines can cause dizziness, including medicines for high blood pressure, pain, or anxiety. If a medicine causes your symptoms, your doctor may recommend that you stop or change the medicine. If it is a problem with your heart, you may need medicine to help your heart work better. If there is no clear reason for your symptoms, your doctor may suggest watching and waiting for a while to see if the dizziness goes away on its own. Follow-up care is a key part of your treatment and safety. Be sure to make and go to all appointments, and call your doctor if you are having problems. It's also a good idea to know your test results and keep a list of the medicines you take. How can you care for yourself at home? · If your doctor recommends or prescribes medicine, take it exactly as directed. Call your doctor if you think you are having a problem with your medicine.   · Do not drive while you feel dizzy. · Try to prevent falls. Steps you can take include:  ? Using nonskid mats, adding grab bars near the tub, and using night-lights. ? Clearing your home so that walkways are free of anything you might trip on.  ? Letting family and friends know that you have been feeling dizzy. This will help them know how to help you. When should you call for help? Call 911 anytime you think you may need emergency care. For example, call if:    · You passed out (lost consciousness).     · You have dizziness along with symptoms of a heart attack. These may include:  ? Chest pain or pressure, or a strange feeling in the chest.  ? Sweating. ? Shortness of breath. ? Nausea or vomiting. ? Pain, pressure, or a strange feeling in the back, neck, jaw, or upper belly or in one or both shoulders or arms. ? Lightheadedness or sudden weakness. ? A fast or irregular heartbeat.     · You have symptoms of a stroke. These may include:  ? Sudden numbness, tingling, weakness, or loss of movement in your face, arm, or leg, especially on only one side of your body. ? Sudden vision changes. ? Sudden trouble speaking. ? Sudden confusion or trouble understanding simple statements. ? Sudden problems with walking or balance. ? A sudden, severe headache that is different from past headaches.    Call your doctor now or seek immediate medical care if:    · You feel dizzy and have a fever, headache, or ringing in your ears.     · You have new or increased nausea and vomiting.     · Your dizziness does not go away or comes back.    Watch closely for changes in your health, and be sure to contact your doctor if:    · You do not get better as expected. Where can you learn more? Go to http://oralia-kalyani.info/  Enter Q823 in the search box to learn more about \"Dizziness: Care Instructions. \"  Current as of: June 26, 2019Content Version: 12.4  © 2616-5838 [employer].   Care instructions adapted under license by [employer] (which disclaims liability or warranty for this information). If you have questions about a medical condition or this instruction, always ask your healthcare professional. [address] any warranty or liability for your use of this information. Patient Education        Palpitations: Care Instructions  Your Care Instructions    Heart palpitations are the uncomfortable sensation that your heart is beating fast or irregularly. You might feel pounding or fluttering in your chest. It might feel like your heart is skipping a beat. Although palpitations may be caused by a heart problem, they also occur because of stress, fatigue, or use of alcohol, caffeine, or nicotine. Many medicines, including diet pills, antihistamines, decongestants, and some herbal products, can cause heart palpitations. Nearly everyone has palpitations from time to time. Depending on your symptoms, your doctor may need to do more tests to try to find the cause of your palpitations. Follow-up care is a key part of your treatment and safety. Be sure to make and go to all appointments, and call your doctor if you are having problems. It's also a good idea to know your test results and keep a list of the medicines you take. How can you care for yourself at home? · Avoid caffeine, nicotine, and excess alcohol. · Do not take illegal drugs, such as methamphetamines and cocaine. · Do not take weight loss or diet medicines unless you talk with your doctor first.  · Get plenty of sleep. · Do not overeat. · If you have palpitations again, take deep breaths and try to relax. This may slow a racing heart. · If you start to feel lightheaded, lie down to avoid injuries that might result if you pass out and fall down. · Keep a record of your palpitations and bring it to your next doctor's appointment. Write down:  ? The date and time. ? Your pulse.  (If your heart is beating fast, it may be hard to count your pulse.)  ? What you were doing when the palpitations started. ? How long the palpitations lasted. ? Any other symptoms. · If an activity causes palpitations, slow down or stop. Talk to your doctor before you do that activity again. · Take your medicines exactly as prescribed. Call your doctor if you think you are having a problem with your medicine. When should you call for help? Call 911 anytime you think you may need emergency care. For example, call if:    · You passed out (lost consciousness).     · You have symptoms of a heart attack. These may include:  ? Chest pain or pressure, or a strange feeling in the chest.  ? Sweating. ? Shortness of breath. ? Pain, pressure, or a strange feeling in the back, neck, jaw, or upper belly or in one or both shoulders or arms. ? Lightheadedness or sudden weakness. ? A fast or irregular heartbeat. After you call  911, the  may tell you to chew 1 adult-strength or 2 to 4 low-dose aspirin. Wait for an ambulance. Do not try to drive yourself.     · You have symptoms of a stroke. These may include:  ? Sudden numbness, tingling, weakness, or loss of movement in your face, arm, or leg, especially on only one side of your body. ? Sudden vision changes. ? Sudden trouble speaking. ? Sudden confusion or trouble understanding simple statements. ? Sudden problems with walking or balance. ? A sudden, severe headache that is different from past headaches.    Call your doctor now or seek immediate medical care if:    · You have heart palpitations and:  ? Are dizzy or lightheaded, or you feel like you may faint. ? Have new or increased shortness of breath.    Watch closely for changes in your health, and be sure to contact your doctor if:    · You continue to have heart palpitations. Where can you learn more?   Go to http://oralia-kalyani.info/  Enter R508 in the search box to learn more about \"Palpitations: Care Instructions. \"  Current as of: December 15, 2019Content Version: 12.4  © 0904-6339 [employer]. Care instructions adapted under license by [employer] (which disclaims liability or warranty for this information). If you have questions about a medical condition or this instruction, always ask your healthcare professional. [place] [address] any warranty or liability for your use of this information.

## 2020-03-17 NOTE — ED PROVIDER NOTES
HPI     64-year-old female with a history of ADHD, anemia, asthma, migraines, PE post gastric bypass surgery, presents the emergency department complaining of acute onset of dizziness followed by feeling tachycardic and short of breath. She states she was at work when her symptoms came on which is around 9 PM.  She denies any recent fever cough cold or congestion. She did try her inhaler which did not help. She denies any calf pain or swelling. She denies any recent travel. She denies any recent surgery or history of cancer. She does not smoke. She is not currently on anticoagulation. She states her symptoms are worse when she stands up or when she turns around. She states it is both feeling lightheaded like she might pass out and like the room is spinning and she is walking off balance. She denies any facial numbness. She denies any focal weakness or numbness. She states both bilateral legs feel numb. She denies any urinary symptoms. She has had a hysterectomy. She has not taken anything for her symptoms. She states she has been diagnosed with vertigo before. She states she is also been diagnosed with dehydration and anemia. Both which have been attributing to similar symptoms in the past.    Past Medical History:   Diagnosis Date    ADHD (attention deficit hyperactivity disorder)     Anemia     Asthma     HX OF IT  DURING SEASONAL ALLERGIES.  NO FLARE UP \"IN A LONG TIME\"    Insomnia     Migraine     Nausea & vomiting     NAUSEA    Obesity     PE (pulmonary embolism) 2009    POST GASTRIC BY PASS       Past Surgical History:   Procedure Laterality Date    HX BREAST REDUCTION N/A 11/21/2017    BILATERAL BREAST REDUCTION/LIPOSUCTION BACK, FLANK, ABDOMEN/ABDOMINOPLASTY WITH EXPAREL performed by Criselda Corrigan MD at [address] HX CHOLECYSTECTOMY      HX GASTRIC BYPASS  2009    HX GYN      BILATERAL TUBAL LIGATION     HX HEENT      TONSILLECTOMY    HX HEENT      EXTRACTION OF WISDOM TEETH X 4    HX HERNIA REPAIR      HX HYSTERECTOMY           Family History:   Problem Relation Age of Onset    Diabetes Mother     Hypertension Mother     Alcohol abuse Father        Social History     Socioeconomic History    Marital status:      Spouse name: Not on file    Number of children: Not on file    Years of education: Not on file    Highest education level: Not on file   Occupational History    Not on file   Social Needs    Financial resource strain: Not on file    Food insecurity     Worry: Not on file     Inability: Not on file    Transportation needs     Medical: Not on file     Non-medical: Not on file   Tobacco Use    Smoking status: Never Smoker    Smokeless tobacco: Never Used   Substance and Sexual Activity    Alcohol use: No    Drug use: No    Sexual activity: Not on file   Lifestyle    Physical activity     Days per week: Not on file     Minutes per session: Not on file    Stress: Not on file   Relationships    Social connections     Talks on phone: Not on file     Gets together: Not on file     Attends [employer] service: Not on file     Active member of club or organization: Not on file     Attends meetings of clubs or organizations: Not on file     Relationship status: Not on file    Intimate partner violence     Fear of current or ex partner: Not on file     Emotionally abused: Not on file     Physically abused: Not on file     Forced sexual activity: Not on file   Other Topics Concern    Not on file   Social History Narrative    Not on file         ALLERGIES: Aspirin; Darvocet a500 [propoxyphene n-acetaminophen]; Percocet [oxycodone-acetaminophen]; Percodan [oxycodone hcl-oxycodone-asa]; and Fentanyl    Review of Systems   Constitutional: Negative for fever. HENT: Negative for congestion, ear pain and tinnitus. Eyes: Negative for visual disturbance. Respiratory: Positive for chest tightness and shortness of breath. Negative for cough. Cardiovascular: Positive for chest pain and palpitations. Negative for leg swelling. Gastrointestinal: Negative for abdominal pain, nausea and vomiting. Genitourinary: Negative for dysuria. Musculoskeletal: Negative for gait problem. Skin: Negative for rash. Neurological: Positive for dizziness and numbness. Negative for facial asymmetry, weakness and headaches. Psychiatric/Behavioral: Negative for dysphoric mood. Vitals:    03/16/20 2245   BP: 109/85   Pulse: (!) 102   Resp: 18   Temp: 97.8 °F (36.6 °C)   SpO2: 100%            Physical Exam  Constitutional:       General: She is not in acute distress. Appearance: She is well-developed. HENT:      Head: Normocephalic and atraumatic. Mouth/Throat:      Pharynx: No oropharyngeal exudate. Eyes:      General: No scleral icterus. Right eye: No discharge. Left eye: No discharge. Pupils: Pupils are equal, round, and reactive to light. Neck:      Musculoskeletal: Normal range of motion and neck supple. Vascular: No JVD. Cardiovascular:      Rate and Rhythm: Normal rate and regular rhythm. Heart sounds: Normal heart sounds. No murmur. Pulmonary:      Effort: Pulmonary effort is normal. No respiratory distress. Breath sounds: Normal breath sounds. No stridor. No wheezing or rales. Chest:      Chest wall: No tenderness. Abdominal:      General: Bowel sounds are normal. There is no distension. Palpations: Abdomen is soft. There is no mass. Tenderness: There is no abdominal tenderness. There is no guarding or rebound. Musculoskeletal: Normal range of motion. Skin:     General: Skin is warm and dry. Capillary Refill: Capillary refill takes less than 2 seconds. Findings: No rash. Neurological:      Mental Status: She is oriented to person, place, and time. Psychiatric:         Behavior: Behavior normal.         Thought Content:  Thought content normal.         Judgment: Judgment normal.          MDM       Procedures      ED EKG interpretation:  Rhythm: sinus tachycardia; and regular . Rate (approx.): 99; Axis: normal; P wave: normal; QRS interval: normal ; ST/T wave: non-specific changes. This EKG was interpreted by Ching Berrios MD,ED Provider. labs ok. CT shows cardiomegaly patient states she has a cardiologist and is aware. Feeling better after fluids/meclizine. Suspect this is inner ear/vertigo. Will d/c with meclizine. Patient has appointment with her pcp on Wednesday. She will take results from today for review with pcp.  Will return if symptoms worsening

## 2020-03-17 NOTE — ED NOTES
I have reviewed discharge instructions with the patient. The patient verbalized understanding. Ambulated out of the department with a steady gait in no acute distress. VSS, respirations even and unlabored.

## 2020-06-20 ENCOUNTER — HOSPITAL ENCOUNTER (EMERGENCY)
Age: 45
Discharge: HOME | End: 2020-06-20
Attending: EMERGENCY MEDICINE
Payer: COMMERCIAL

## 2020-06-20 ENCOUNTER — APPOINTMENT (OUTPATIENT)
Dept: CT IMAGING | Age: 45
End: 2020-06-20
Attending: EMERGENCY MEDICINE
Payer: COMMERCIAL

## 2020-06-20 VITALS
RESPIRATION RATE: 19 BRPM | DIASTOLIC BLOOD PRESSURE: 70 MMHG | OXYGEN SATURATION: 100 % | TEMPERATURE: 97.7 F | SYSTOLIC BLOOD PRESSURE: 118 MMHG | HEART RATE: 83 BPM

## 2020-06-20 DIAGNOSIS — R00.2 PALPITATIONS: ICD-10-CM

## 2020-06-20 DIAGNOSIS — R55 NEAR SYNCOPE: Primary | ICD-10-CM

## 2020-06-20 DIAGNOSIS — E87.6 HYPOKALEMIA: ICD-10-CM

## 2020-06-20 LAB
ALBUMIN SERPL-MCNC: 4 G/DL (ref 3.5–5)
ALBUMIN/GLOB SERPL: 0.8 {RATIO} (ref 1.1–2.2)
ALP SERPL-CCNC: 190 U/L (ref 45–117)
ALT SERPL-CCNC: 24 U/L (ref 12–78)
ANION GAP SERPL CALC-SCNC: 10 MMOL/L (ref 5–15)
AST SERPL-CCNC: 27 U/L (ref 15–37)
BASOPHILS # BLD: 0 K/UL (ref 0–0.1)
BASOPHILS NFR BLD: 0 % (ref 0–1)
BILIRUB SERPL-MCNC: 0.2 MG/DL (ref 0.2–1)
BUN SERPL-MCNC: 5 MG/DL (ref 6–20)
BUN/CREAT SERPL: 7 (ref 12–20)
CALCIUM SERPL-MCNC: 9.3 MG/DL (ref 8.5–10.1)
CHLORIDE SERPL-SCNC: 106 MMOL/L (ref 97–108)
CO2 SERPL-SCNC: 22 MMOL/L (ref 21–32)
COMMENT, HOLDF: NORMAL
CREAT SERPL-MCNC: 0.73 MG/DL (ref 0.55–1.02)
D DIMER PPP FEU-MCNC: 1.56 MG/L FEU (ref 0–0.65)
DIFFERENTIAL METHOD BLD: ABNORMAL
EOSINOPHIL # BLD: 0 K/UL (ref 0–0.4)
EOSINOPHIL NFR BLD: 0 % (ref 0–7)
ERYTHROCYTE [DISTWIDTH] IN BLOOD BY AUTOMATED COUNT: 20.1 % (ref 11.5–14.5)
GLOBULIN SER CALC-MCNC: 5 G/DL (ref 2–4)
GLUCOSE SERPL-MCNC: 120 MG/DL (ref 65–100)
HCT VFR BLD AUTO: 34.8 % (ref 35–47)
HGB BLD-MCNC: 9.8 G/DL (ref 11.5–16)
IMM GRANULOCYTES # BLD AUTO: 0 K/UL (ref 0–0.04)
IMM GRANULOCYTES NFR BLD AUTO: 0 % (ref 0–0.5)
LYMPHOCYTES # BLD: 1.7 K/UL (ref 0.8–3.5)
LYMPHOCYTES NFR BLD: 24 % (ref 12–49)
MCH RBC QN AUTO: 17.9 PG (ref 26–34)
MCHC RBC AUTO-ENTMCNC: 28.2 G/DL (ref 30–36.5)
MCV RBC AUTO: 63.7 FL (ref 80–99)
MONOCYTES # BLD: 0.3 K/UL (ref 0–1)
MONOCYTES NFR BLD: 4 % (ref 5–13)
NEUTS SEG # BLD: 5.1 K/UL (ref 1.8–8)
NEUTS SEG NFR BLD: 72 % (ref 32–75)
NRBC # BLD: 0 K/UL (ref 0–0.01)
NRBC BLD-RTO: 0 PER 100 WBC
PLATELET # BLD AUTO: 306 K/UL (ref 150–400)
PLATELET COMMENTS,PCOM: ABNORMAL
POTASSIUM SERPL-SCNC: 3 MMOL/L (ref 3.5–5.1)
PROT SERPL-MCNC: 9 G/DL (ref 6.4–8.2)
RBC # BLD AUTO: 5.46 M/UL (ref 3.8–5.2)
RBC MORPH BLD: ABNORMAL
SAMPLES BEING HELD,HOLD: NORMAL
SODIUM SERPL-SCNC: 138 MMOL/L (ref 136–145)
TROPONIN I SERPL-MCNC: <0.05 NG/ML
WBC # BLD AUTO: 7.1 K/UL (ref 3.6–11)

## 2020-06-20 PROCEDURE — 85379 FIBRIN DEGRADATION QUANT: CPT

## 2020-06-20 PROCEDURE — 85025 COMPLETE CBC W/AUTO DIFF WBC: CPT

## 2020-06-20 PROCEDURE — 74011250637 HC RX REV CODE- 250/637: Performed by: EMERGENCY MEDICINE

## 2020-06-20 PROCEDURE — 80053 COMPREHEN METABOLIC PANEL: CPT

## 2020-06-20 PROCEDURE — 93005 ELECTROCARDIOGRAM TRACING: CPT

## 2020-06-20 PROCEDURE — 84484 ASSAY OF TROPONIN QUANT: CPT

## 2020-06-20 PROCEDURE — 74011250636 HC RX REV CODE- 250/636: Performed by: EMERGENCY MEDICINE

## 2020-06-20 PROCEDURE — 36415 COLL VENOUS BLD VENIPUNCTURE: CPT

## 2020-06-20 PROCEDURE — 71275 CT ANGIOGRAPHY CHEST: CPT

## 2020-06-20 PROCEDURE — 74011000258 HC RX REV CODE- 258: Performed by: RADIOLOGY

## 2020-06-20 PROCEDURE — 99285 EMERGENCY DEPT VISIT HI MDM: CPT

## 2020-06-20 PROCEDURE — 74011636320 HC RX REV CODE- 636/320: Performed by: RADIOLOGY

## 2020-06-20 RX ORDER — SODIUM CHLORIDE 0.9 % (FLUSH) 0.9 %
10 SYRINGE (ML) INJECTION
Status: COMPLETED | OUTPATIENT
Start: 2020-06-20 | End: 2020-06-20

## 2020-06-20 RX ORDER — POTASSIUM CHLORIDE 750 MG/1
40 TABLET, FILM COATED, EXTENDED RELEASE ORAL
Status: COMPLETED | OUTPATIENT
Start: 2020-06-20 | End: 2020-06-20

## 2020-06-20 RX ADMIN — SODIUM CHLORIDE 50 ML: 900 INJECTION, SOLUTION INTRAVENOUS at 06:37

## 2020-06-20 RX ADMIN — IOPAMIDOL 80 ML: 755 INJECTION, SOLUTION INTRAVENOUS at 06:37

## 2020-06-20 RX ADMIN — Medication 10 ML: at 06:37

## 2020-06-20 RX ADMIN — POTASSIUM CHLORIDE 40 MEQ: 750 TABLET, FILM COATED, EXTENDED RELEASE ORAL at 07:03

## 2020-06-20 RX ADMIN — SODIUM CHLORIDE 1000 ML: 900 INJECTION, SOLUTION INTRAVENOUS at 04:55

## 2020-06-20 NOTE — ED NOTES
Discharge instructions given to pt by RN. Pt educated on prescribed medications in teach back method and verbalizes understanding. Opportunity for questions provided.  Pt ambulatory out of unit, in no acute distress and taken home by daughter

## 2020-06-20 NOTE — LETTER
NOTIFICATION RETURN TO WORK / SCHOOL 
 
6/20/2020 7:19 AM 
 
Ms. Darryl Vera [address] [id number] To Whom It May Concern: 
 
Darryl Vera is currently under the care of [address]. She will return to work/school on: 6/22/20 Darryl Vera may return to work/school with the following restrictions: no restrictions. If there are questions or concerns please have the patient contact our office. Sincerely, 
 
 
Gloria Becerra

## 2020-06-20 NOTE — ED PROVIDER NOTES
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 Symptoms started about 30 minutes prior to arrival.  Patient states that she began to feel that her heart was racing and lightheadedness. She sat down out of fear she might pass out but continued to feel diaphoretic and palpitations. She also noted some chest pressure. She denies any cough or fever. She has had symptoms like this in the past.  She adds that she was seen here in the ED about a week ago with similar symptoms and treated with Antivert. Past medical history significant for anemia, migraines, nausea, pulmonary embolism, gastric bypass. She states symptoms of largely resolved by the time she arrived in the emergency department but still feeling quite anxious with some chest pressure. Past Medical History:   Diagnosis Date    ADHD (attention deficit hyperactivity disorder)     Anemia     Asthma     HX OF IT  DURING SEASONAL ALLERGIES.  NO FLARE UP \"IN A LONG TIME\"    Insomnia     Migraine     Nausea & vomiting     NAUSEA    Obesity     PE (pulmonary embolism) 2009    POST GASTRIC BY PASS       Past Surgical History:   Procedure Laterality Date    HX BREAST REDUCTION N/A 11/21/2017    BILATERAL BREAST REDUCTION/LIPOSUCTION BACK, FLANK, ABDOMEN/ABDOMINOPLASTY WITH EXPAREL performed by Ines Mcmahon MD at [address] HX CHOLECYSTECTOMY      HX GASTRIC BYPASS  2009    HX GYN      BILATERAL TUBAL LIGATION     HX HEENT      TONSILLECTOMY    HX HEENT      EXTRACTION OF WISDOM TEETH X 4    HX HERNIA REPAIR      HX HYSTERECTOMY           Family History:   Problem Relation Age of Onset    Diabetes Mother     Hypertension Mother     Alcohol abuse Father        Social History     Socioeconomic History    Marital status:      Spouse name: Not on file    Number of children: Not on file    Years of education: Not on file    Highest education level: Not on file   Occupational History    Not on file   Social Needs    Financial resource strain: Not on file    Food insecurity     Worry: Not on file     Inability: Not on file    Transportation needs     Medical: Not on file     Non-medical: Not on file   Tobacco Use    Smoking status: Never Smoker    Smokeless tobacco: Never Used   Substance and Sexual Activity    Alcohol use: No    Drug use: No    Sexual activity: Not on file   Lifestyle    Physical activity     Days per week: Not on file     Minutes per session: Not on file    Stress: Not on file   Relationships    Social connections     Talks on phone: Not on file     Gets together: Not on file     Attends [employer] service: Not on file     Active member of club or organization: Not on file     Attends meetings of clubs or organizations: Not on file     Relationship status: Not on file    Intimate partner violence     Fear of current or ex partner: Not on file     Emotionally abused: Not on file     Physically abused: Not on file     Forced sexual activity: Not on file   Other Topics Concern    Not on file   Social History Narrative    Not on file         ALLERGIES: Aspirin; Darvocet a500 [propoxyphene n-acetaminophen]; Percocet [oxycodone-acetaminophen]; Percodan [oxycodone hcl-oxycodone-asa]; and Fentanyl    Review of Systems   Constitutional: Negative for fever. HENT: Negative for facial swelling. Eyes: Negative for visual disturbance. Respiratory: Negative for chest tightness. Cardiovascular: Positive for palpitations. Negative for chest pain. Gastrointestinal: Negative for abdominal pain. Genitourinary: Negative for difficulty urinating and dysuria. Musculoskeletal: Negative for arthralgias. Skin: Negative for rash. Neurological: Negative for headaches. Hematological: Negative for adenopathy. Psychiatric/Behavioral: Negative for suicidal ideas.        Vitals:    06/20/20 0335   BP: (!) 133/91   Pulse: (!) 101   Resp: 17   Temp: 97.7 °F (36.5 °C)   SpO2: 97%            Physical Exam  Vitals signs and nursing note reviewed. Constitutional:       General: She is not in acute distress. Appearance: She is well-developed. HENT:      Head: Normocephalic and atraumatic. Eyes:      General: No scleral icterus. Conjunctiva/sclera: Conjunctivae normal.      Pupils: Pupils are equal, round, and reactive to light. Neck:      Musculoskeletal: Normal range of motion and neck supple. Cardiovascular:      Rate and Rhythm: Tachycardia present. Heart sounds: No murmur. Pulmonary:      Effort: Pulmonary effort is normal. No respiratory distress. Abdominal:      General: There is no distension. Musculoskeletal: Normal range of motion. Skin:     General: Skin is warm and dry. Findings: No rash. Neurological:      Mental Status: She is alert and oriented to person, place, and time. MDM  Number of Diagnoses or Management Options  Hypokalemia:   Near syncope:   Palpitations:   Diagnosis management comments: Assessment: Patient resting comfortably at this time. Vital signs stable. Blood work unremarkable except for low potassium. CTA of the chest also unremarkable. No evidence of any dangerous etiology. Will give patient oral potassium to help replete and stable for discharge home. She can return to the ED for any worsening symptoms. Amount and/or Complexity of Data Reviewed  Clinical lab tests: reviewed  Tests in the radiology section of CPT®: reviewed  Tests in the medicine section of CPT®: reviewed      ED Course as of Jun 20 0537   Sat Jun 20, 2020   0350 ED EKG interpretation:  Rhythm: normal sinus rhythm. Rate (approx.): 99. Axis: normal.  ST segment:  No concerning ST elevations or depressions. This EKG was interpreted by Nely Carrillo MD,ED Provider.        EKG 12 LEAD INITIAL [JM]      ED Course User Index  [JM] Opal Gary MD       Procedures    5:37 AM  IV Raymond Villatoro MD gained IV access using  20 gauge needle. Indication is vascular access could not be obtained. The site was cleansed with an alcohol prep, the L antecubital vein was localized with ultrasound guidance in an anterior approach. Line confirmation was obtained by direct visualization and good blood return. No anaesthetic was used. The line was successfully flushed with normal saline and was secured with transparent tape. The patient tolerated the procedure well. There were no complications.

## 2020-06-20 NOTE — ED TRIAGE NOTES
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 Pt had a near syncope episode where she felt diaphoretic, dizzy, with palpitations 30mins PTA. Pt endorses headache, indigestion, and anxiety at this time.

## 2020-06-22 LAB
ATRIAL RATE: 99 BPM
CALCULATED P AXIS, ECG09: 41 DEGREES
CALCULATED R AXIS, ECG10: 15 DEGREES
CALCULATED T AXIS, ECG11: 11 DEGREES
DIAGNOSIS, 93000: NORMAL
P-R INTERVAL, ECG05: 204 MS
Q-T INTERVAL, ECG07: 362 MS
QRS DURATION, ECG06: 74 MS
QTC CALCULATION (BEZET), ECG08: 464 MS
VENTRICULAR RATE, ECG03: 99 BPM

## 2022-03-19 PROBLEM — N62 HYPERTROPHY OF BREAST: Status: ACTIVE | Noted: 2017-11-21

## 2022-03-19 PROBLEM — Z41.1 ENCOUNTER FOR COSMETIC SURGERY: Status: ACTIVE | Noted: 2017-11-21

## 2023-05-14 RX ORDER — DIAZEPAM 5 MG/1
5 TABLET ORAL EVERY 8 HOURS PRN
COMMUNITY
Start: 2017-11-22

## 2023-05-14 RX ORDER — IBUPROFEN 200 MG
600 TABLET ORAL PRN
COMMUNITY

## 2023-05-14 RX ORDER — DEXTROAMPHETAMINE SACCHARATE, AMPHETAMINE ASPARTATE, DEXTROAMPHETAMINE SULFATE AND AMPHETAMINE SULFATE 3.75; 3.75; 3.75; 3.75 MG/1; MG/1; MG/1; MG/1
30 TABLET ORAL 2 TIMES DAILY
COMMUNITY

## 2023-05-14 RX ORDER — HYDROCODONE BITARTRATE AND ACETAMINOPHEN 5; 325 MG/1; MG/1
2 TABLET ORAL EVERY 6 HOURS PRN
COMMUNITY
Start: 2017-11-22

## 2023-05-14 RX ORDER — DIPHENHYDRAMINE HCL 25 MG
25 CAPSULE ORAL EVERY 6 HOURS PRN
COMMUNITY

## 2023-06-01 ENCOUNTER — HOSPITAL ENCOUNTER (EMERGENCY)
Facility: HOSPITAL | Age: 48
Discharge: HOME | End: 2023-06-01

## 2023-06-01 VITALS
RESPIRATION RATE: 18 BRPM | DIASTOLIC BLOOD PRESSURE: 88 MMHG | SYSTOLIC BLOOD PRESSURE: 148 MMHG | HEIGHT: 61 IN | HEART RATE: 85 BPM | BODY MASS INDEX: 32.1 KG/M2 | OXYGEN SATURATION: 100 % | WEIGHT: 170 LBS | TEMPERATURE: 98.5 F

## 2023-06-01 DIAGNOSIS — S02.5XXA CLOSED FRACTURE OF TOOTH, INITIAL ENCOUNTER: Primary | ICD-10-CM

## 2023-06-01 PROCEDURE — 99283 EMERGENCY DEPT VISIT LOW MDM: CPT

## 2023-06-01 PROCEDURE — 6370000000 HC RX 637 (ALT 250 FOR IP): Performed by: NURSE PRACTITIONER

## 2023-06-01 RX ORDER — ONDANSETRON 4 MG/1
4 TABLET, ORALLY DISINTEGRATING ORAL
Status: COMPLETED | OUTPATIENT
Start: 2023-06-01 | End: 2023-06-01

## 2023-06-01 RX ORDER — AMOXICILLIN 500 MG/1
500 CAPSULE ORAL 3 TIMES DAILY
Qty: 30 CAPSULE | Refills: 0 | Status: SHIPPED | OUTPATIENT
Start: 2023-06-01 | End: 2023-06-11

## 2023-06-01 RX ORDER — AMOXICILLIN 500 MG/1
500 CAPSULE ORAL
Status: COMPLETED | OUTPATIENT
Start: 2023-06-01 | End: 2023-06-01

## 2023-06-01 RX ORDER — IBUPROFEN 600 MG/1
600 TABLET ORAL EVERY 6 HOURS PRN
Qty: 20 TABLET | Refills: 0 | Status: SHIPPED | OUTPATIENT
Start: 2023-06-01

## 2023-06-01 RX ORDER — HYDROCODONE BITARTRATE AND ACETAMINOPHEN 5; 325 MG/1; MG/1
2 TABLET ORAL
Status: COMPLETED | OUTPATIENT
Start: 2023-06-01 | End: 2023-06-01

## 2023-06-01 RX ORDER — HYDROCODONE BITARTRATE AND ACETAMINOPHEN 7.5; 325 MG/1; MG/1
1 TABLET ORAL EVERY 6 HOURS PRN
Qty: 16 TABLET | Refills: 0 | Status: SHIPPED | OUTPATIENT
Start: 2023-06-01 | End: 2023-06-05

## 2023-06-01 RX ADMIN — ONDANSETRON 4 MG: 4 TABLET, ORALLY DISINTEGRATING ORAL at 16:15

## 2023-06-01 RX ADMIN — AMOXICILLIN 500 MG: 500 CAPSULE ORAL at 16:15

## 2023-06-01 RX ADMIN — HYDROCODONE BITARTRATE AND ACETAMINOPHEN 2 TABLET: 5; 325 TABLET ORAL at 16:15

## 2023-06-01 ASSESSMENT — PAIN SCALES - GENERAL
PAINLEVEL_OUTOF10: 5
PAINLEVEL_OUTOF10: 10
PAINLEVEL_OUTOF10: 10

## 2023-06-01 ASSESSMENT — LIFESTYLE VARIABLES
HOW MANY STANDARD DRINKS CONTAINING ALCOHOL DO YOU HAVE ON A TYPICAL DAY: PATIENT DOES NOT DRINK
HOW OFTEN DO YOU HAVE A DRINK CONTAINING ALCOHOL: NEVER

## 2023-06-01 ASSESSMENT — PAIN DESCRIPTION - LOCATION
LOCATION: TEETH
LOCATION: TEETH

## 2023-06-01 ASSESSMENT — PAIN DESCRIPTION - ORIENTATION: ORIENTATION: LEFT

## 2023-06-01 ASSESSMENT — PAIN - FUNCTIONAL ASSESSMENT
PAIN_FUNCTIONAL_ASSESSMENT: 0-10
PAIN_FUNCTIONAL_ASSESSMENT: 0-10

## 2023-06-01 NOTE — ED TRIAGE NOTES
Was eating about 2 hours ago and felt her tooth crack, she called her dentist and they told her to come here for pain management and that the dentist could not see her today

## 2023-06-01 NOTE — ED PROVIDER NOTES
[facility] EMERGENCY DEPT  EMERGENCY DEPARTMENT HISTORY AND PHYSICAL EXAM      Date: 6/1/2023  Patient Name: Jeb Sarmiento  MRN: [id number]  [place]: 1975  Date of evaluation: 6/1/2023  Provider: ELLIOTT Healy NP   Note Started: 3:49 PM EDT 6/1/23    HISTORY OF PRESENT ILLNESS     Chief Complaint   Patient presents with    Dental Injury       History Provided By: Patient    HPI: Jeb Sarmiento is a 56 y.o. female ***    PAST MEDICAL HISTORY   Past Medical History:  Past Medical History:   Diagnosis Date    ADHD (attention deficit hyperactivity disorder)     Anemia     Asthma     HX OF IT  DURING SEASONAL ALLERGIES. NO FLARE UP \"IN A LONG TIME\"    Insomnia     Migraine     Nausea & vomiting     NAUSEA    Obesity     PE (pulmonary embolism) 2009    POST GASTRIC BY PASS       Past Surgical History:  Past Surgical History:   Procedure Laterality Date    BREAST REDUCTION SURGERY N/A 11/21/2017    BILATERAL BREAST REDUCTION/LIPOSUCTION BACK, FLANK, ABDOMEN/ABDOMINOPLASTY WITH EXPAREL performed by Alley Figueroa MD at [address] GASTRIC BYPASS SURGERY  2009    GYN      BILATERAL TUBAL LIGATION     HEENT      TONSILLECTOMY    HEENT      EXTRACTION OF WISDOM TEETH X 4    HERNIA REPAIR      HYSTERECTOMY         Family History:  Family History   Problem Relation Age of Onset    Diabetes Mother     Alcohol Abuse Father     Hypertension Mother        Social History:  Social History     Tobacco Use    Smoking status: Never    Smokeless tobacco: Never   Substance Use Topics    Alcohol use: No    Drug use: No       Allergies: Allergies   Allergen Reactions    Aspirin Anaphylaxis    Fentanyl Shortness Of Breath    Oxycodone-Acetaminophen Anaphylaxis       PCP: No primary care provider on file.     Current Meds:   Current Facility-Administered Medications   Medication Dose Route Frequency Provider Last Rate Last Admin    amoxicillin (AMOXIL) capsule 500 mg  500 mg

## 2023-11-17 ENCOUNTER — HOSPITAL ENCOUNTER (EMERGENCY)
Facility: HOSPITAL | Age: 48
Discharge: HOME | End: 2023-11-17
Attending: EMERGENCY MEDICINE

## 2023-11-17 VITALS
WEIGHT: 165 LBS | BODY MASS INDEX: 31.15 KG/M2 | OXYGEN SATURATION: 100 % | RESPIRATION RATE: 18 BRPM | SYSTOLIC BLOOD PRESSURE: 126 MMHG | HEIGHT: 61 IN | HEART RATE: 100 BPM | DIASTOLIC BLOOD PRESSURE: 86 MMHG | TEMPERATURE: 98.5 F

## 2023-11-17 DIAGNOSIS — K04.7 DENTAL INFECTION: Primary | ICD-10-CM

## 2023-11-17 PROCEDURE — 96372 THER/PROPH/DIAG INJ SC/IM: CPT

## 2023-11-17 PROCEDURE — 6360000002 HC RX W HCPCS: Performed by: EMERGENCY MEDICINE

## 2023-11-17 PROCEDURE — 99284 EMERGENCY DEPT VISIT MOD MDM: CPT

## 2023-11-17 RX ORDER — KETOROLAC TROMETHAMINE 10 MG/1
10 TABLET, FILM COATED ORAL EVERY 6 HOURS PRN
Qty: 20 TABLET | Refills: 0 | Status: SHIPPED | OUTPATIENT
Start: 2023-11-17

## 2023-11-17 RX ORDER — AMOXICILLIN AND CLAVULANATE POTASSIUM 875; 125 MG/1; MG/1
1 TABLET, FILM COATED ORAL 2 TIMES DAILY
Qty: 14 TABLET | Refills: 0 | Status: SHIPPED | OUTPATIENT
Start: 2023-11-17 | End: 2023-11-24

## 2023-11-17 RX ORDER — KETOROLAC TROMETHAMINE 30 MG/ML
30 INJECTION, SOLUTION INTRAMUSCULAR; INTRAVENOUS ONCE
Status: COMPLETED | OUTPATIENT
Start: 2023-11-17 | End: 2023-11-17

## 2023-11-17 RX ADMIN — KETOROLAC TROMETHAMINE 30 MG: 30 INJECTION INTRAMUSCULAR; INTRAVENOUS at 11:51

## 2023-11-17 ASSESSMENT — PAIN - FUNCTIONAL ASSESSMENT: PAIN_FUNCTIONAL_ASSESSMENT: 0-10

## 2023-11-17 ASSESSMENT — PAIN SCALES - GENERAL: PAINLEVEL_OUTOF10: 7

## 2023-11-17 NOTE — ED PROVIDER NOTES
[facility] EMERGENCY DEPT  EMERGENCY DEPARTMENT HISTORY AND PHYSICAL EXAM      Date: 11/17/2023  Patient Name: Escobar Santos  MRN: [id number]  YOB: 1975  Date of evaluation: 11/17/2023  Provider: Dioni Madrigal MD   Note Started: 11:48 AM EST 11/17/23    HISTORY OF PRESENT ILLNESS     Chief Complaint   Patient presents with    Dental Problem       History Provided By: Patient    HPI: Escobar Santos is a 56 y.o. female here with right mandibular dental pain. She is scheduled for dental extraction coming up but started having worsening pain yesterday. She hasPut some sort of over-the-counter paste over the teeth. Started having swelling to her left face    PAST MEDICAL HISTORY   Past Medical History:  Past Medical History:   Diagnosis Date    ADHD (attention deficit hyperactivity disorder)     Anemia     Asthma     HX OF IT  DURING SEASONAL ALLERGIES. NO FLARE UP \"IN A LONG TIME\"    Insomnia     Migraine     Nausea & vomiting     NAUSEA    Obesity     PE (pulmonary embolism) 2009    POST GASTRIC BY PASS       Past Surgical History:  Past Surgical History:   Procedure Laterality Date    BREAST REDUCTION SURGERY N/A 11/21/2017    BILATERAL BREAST REDUCTION/LIPOSUCTION BACK, FLANK, ABDOMEN/ABDOMINOPLASTY WITH EXPAREL performed by Yuniel Arauz MD at [address]  2009    GYN      BILATERAL TUBAL LIGATION     HEENT      TONSILLECTOMY    HEENT      EXTRACTION OF WISDOM TEETH X 4    HERNIA REPAIR      HYSTERECTOMY         Family History:  Family History   Problem Relation Age of Onset    Diabetes Mother     Alcohol Abuse Father     Hypertension Mother        Social History:  Social History     Tobacco Use    Smoking status: Never    Smokeless tobacco: Never   Substance Use Topics    Alcohol use: No    Drug use: No       Allergies:   Allergies   Allergen Reactions    Aspirin Anaphylaxis    Fentanyl Shortness Of Breath    Oxycodone-Acetaminophen Anaphylaxis

## (undated) DEVICE — SOLUTION IV 1000ML 0.9% SOD CHL

## (undated) DEVICE — POOLE SUCTION INSTRUMENT WITH REMOVABLE SHEATH: Brand: POOLE

## (undated) DEVICE — DRAIN SURG 15FR L3/16IN DIA4.7MM SIL CHN RND FULL FLUT TRCR

## (undated) DEVICE — Device

## (undated) DEVICE — Z CONVERTED USE 2271116 TUBING ASPIR 9 FT STRL ULTRA-LIMP

## (undated) DEVICE — 3M™ TEGADERM™ TRANSPARENT FILM DRESSING FRAME STYLE, 1627, 4 IN X 10 IN (10 CM X 25 CM), 20/CT 4CT/CASE: Brand: 3M™ TEGADERM™

## (undated) DEVICE — INTENDED USE FOR SURGICAL MARKING ON INTACT SKIN, ALSO PROVIDES A PERMANENT METHOD OF IDENTIFYING OBJECTS IN THE OPERATING ROOM: Brand: WRITESITE® REGULAR TIP SKIN MARKER

## (undated) DEVICE — DRAIN SURG W10MMXL20CM SIL FULL PERF HUBLESS FLAT RADPQ

## (undated) DEVICE — DERMABOND SKIN ADH 0.7ML -- DERMABOND ADVANCED 12/BX

## (undated) DEVICE — SURGICAL PROCEDURE PACK BASIN MAJ SET CUST NO CAUT

## (undated) DEVICE — SOLUTION LACTATED RINGERS INJECTION USP

## (undated) DEVICE — SUT SLK 0 30IN CT1 BLK --

## (undated) DEVICE — 1200 GUARD II KIT W/5MM TUBE W/O VAC TUBE: Brand: GUARDIAN

## (undated) DEVICE — INTENDED FOR TISSUE SEPARATION, AND OTHER PROCEDURES THAT REQUIRE A SHARP SURGICAL BLADE TO PUNCTURE OR CUT.: Brand: BARD-PARKER ® CARBON RIB-BACK BLADES

## (undated) DEVICE — KENDALL SCD EXPRESS SLEEVES, KNEE LENGTH, MEDIUM: Brand: KENDALL SCD

## (undated) DEVICE — REM POLYHESIVE ADULT PATIENT RETURN ELECTRODE: Brand: VALLEYLAB

## (undated) DEVICE — TUBING SUCT L9FT FOR AUTOFUSE INFLTR SYS

## (undated) DEVICE — ROCKER SWITCH PENCIL BLADE ELECTRODE, HOLSTER: Brand: EDGE

## (undated) DEVICE — NDL SINE QNCKE 22GAX1.5IN BLK --

## (undated) DEVICE — DRESSING PETRO GZ XRFRM CURAD ST OVERWRAP 5 X 9 IN

## (undated) DEVICE — SLIM BODY SKIN STAPLER: Brand: APPOSE ULC

## (undated) DEVICE — DRAPE,CHEST,FENES,15X10,STERIL: Brand: MEDLINE

## (undated) DEVICE — INFECTION CONTROL KIT SYS

## (undated) DEVICE — WRAP SURG W1.31XL1.34M CARD FOR PT 165-172CM THERMOWRP

## (undated) DEVICE — STERILE POLYISOPRENE POWDER-FREE SURGICAL GLOVES: Brand: PROTEXIS

## (undated) DEVICE — SUTURE PDS II SZ 1 L96IN ABSRB VLT TP-1 L65MM 1/2 CIR Z880G

## (undated) DEVICE — SUTURE PDS II SZ 0 L27IN ABSRB VLT L36MM CT-1 1/2 CIR Z340H

## (undated) DEVICE — Z CONVERTED USE 2271043 CONTAINER SPEC COLL 4OZ SCR ON LID PEEL PCH

## (undated) DEVICE — SUTURE MCRYL SZ 3-0 L27IN ABSRB UD L24MM PS-1 3/8 CIR PRIM Y936H

## (undated) DEVICE — SUTURE MCRYL SZ 2-0 L36IN ABSRB UD L36MM CT-1 1/2 CIR Y945H

## (undated) DEVICE — SUTURE PDS II SZ 2-0 L27IN ABSRB VLT L36MM CT-1 1/2 CIR Z339H

## (undated) DEVICE — DRAPE,REIN 53X77,STERILE: Brand: MEDLINE

## (undated) DEVICE — SUTURE VCRL SZ 2-0 L36IN ABSRB UD L36MM CT-1 1/2 CIR J945H

## (undated) DEVICE — Z DISCONTINUED USE 2716304 SUTURE STRATAFIX SPRL SZ 3-0 L12IN ABSRB UD FS-1 L24MM 3/8

## (undated) DEVICE — DBD-PACK,LAPAROTOMY,2 REINFORCED GOWNS: Brand: MEDLINE

## (undated) DEVICE — TRAY CATH OD16FR SIL URIN M STATLOK STBL DEV SURSTP

## (undated) DEVICE — GAUZE SPONGES,12 PLY: Brand: CURITY

## (undated) DEVICE — 2DSM24 2-0 UND MONODERM 30X30: Brand: 2DSM24 2-0 UND MONODERM 30X30

## (undated) DEVICE — SIMPLICITY FLUFF UNDERPAD 23X36, MODERATE: Brand: SIMPLICITY

## (undated) DEVICE — NEEDLE HYPO 22GA L1.5IN BLK POLYPR HUB S STL REG BVL STR

## (undated) DEVICE — DRAIN SURG 19FR SIL RND HUBLESS W/ 0.25IN BEND TRCR BLAK

## (undated) DEVICE — (D)PREP SKN CHLRAPRP APPL 26ML -- CONVERT TO ITEM 371833

## (undated) DEVICE — SPONGE LAP 18X18IN STRL -- 5/PK

## (undated) DEVICE — SYR 50ML LR LCK 1ML GRAD NSAF --

## (undated) DEVICE — TOWEL SURG W17XL27IN STD BLU COT NONFENESTRATED PREWASHED